# Patient Record
Sex: MALE | Race: WHITE | Employment: FULL TIME | URBAN - NONMETROPOLITAN AREA
[De-identification: names, ages, dates, MRNs, and addresses within clinical notes are randomized per-mention and may not be internally consistent; named-entity substitution may affect disease eponyms.]

---

## 2019-11-19 ENCOUNTER — OFFICE VISIT (OUTPATIENT)
Dept: FAMILY MEDICINE CLINIC | Age: 54
End: 2019-11-19
Payer: COMMERCIAL

## 2019-11-19 VITALS
HEIGHT: 70 IN | BODY MASS INDEX: 33.76 KG/M2 | TEMPERATURE: 98.5 F | DIASTOLIC BLOOD PRESSURE: 82 MMHG | HEART RATE: 80 BPM | SYSTOLIC BLOOD PRESSURE: 134 MMHG | WEIGHT: 235.8 LBS | RESPIRATION RATE: 20 BRPM

## 2019-11-19 DIAGNOSIS — K64.4 EXTERNAL HEMORRHOID: ICD-10-CM

## 2019-11-19 DIAGNOSIS — Z12.5 SCREENING FOR PROSTATE CANCER: ICD-10-CM

## 2019-11-19 DIAGNOSIS — I10 ESSENTIAL HYPERTENSION: ICD-10-CM

## 2019-11-19 DIAGNOSIS — Z00.00 ROUTINE PHYSICAL EXAMINATION: Primary | ICD-10-CM

## 2019-11-19 PROCEDURE — 99386 PREV VISIT NEW AGE 40-64: CPT | Performed by: NURSE PRACTITIONER

## 2019-11-19 RX ORDER — LISINOPRIL 20 MG/1
20 TABLET ORAL DAILY
COMMUNITY
End: 2019-11-19 | Stop reason: SDUPTHER

## 2019-11-19 RX ORDER — LISINOPRIL 20 MG/1
20 TABLET ORAL DAILY
Qty: 90 TABLET | Refills: 3 | Status: SHIPPED | OUTPATIENT
Start: 2019-11-19 | End: 2020-11-17 | Stop reason: SDUPTHER

## 2019-11-19 RX ORDER — METAPROTERENOL SULFATE 10 MG
1 TABLET ORAL DAILY
COMMUNITY

## 2019-11-19 SDOH — HEALTH STABILITY: MENTAL HEALTH: HOW MANY STANDARD DRINKS CONTAINING ALCOHOL DO YOU HAVE ON A TYPICAL DAY?: 5 OR 6

## 2019-11-19 SDOH — HEALTH STABILITY: MENTAL HEALTH: HOW OFTEN DO YOU HAVE A DRINK CONTAINING ALCOHOL?: 2-4 TIMES A MONTH

## 2019-11-19 ASSESSMENT — ENCOUNTER SYMPTOMS
GASTROINTESTINAL NEGATIVE: 1
RESPIRATORY NEGATIVE: 1
EYES NEGATIVE: 1

## 2019-11-19 ASSESSMENT — PATIENT HEALTH QUESTIONNAIRE - PHQ9
SUM OF ALL RESPONSES TO PHQ9 QUESTIONS 1 & 2: 0
2. FEELING DOWN, DEPRESSED OR HOPELESS: 0
SUM OF ALL RESPONSES TO PHQ QUESTIONS 1-9: 0
1. LITTLE INTEREST OR PLEASURE IN DOING THINGS: 0
SUM OF ALL RESPONSES TO PHQ QUESTIONS 1-9: 0

## 2020-02-17 ENCOUNTER — TELEPHONE (OUTPATIENT)
Dept: FAMILY MEDICINE CLINIC | Age: 55
End: 2020-02-17

## 2020-02-17 RX ORDER — AMOXICILLIN 500 MG/1
500 CAPSULE ORAL 3 TIMES DAILY
Qty: 30 CAPSULE | Refills: 0 | Status: SHIPPED | OUTPATIENT
Start: 2020-02-17 | End: 2020-02-27

## 2020-11-17 ENCOUNTER — OFFICE VISIT (OUTPATIENT)
Dept: FAMILY MEDICINE CLINIC | Age: 55
End: 2020-11-17
Payer: COMMERCIAL

## 2020-11-17 VITALS
SYSTOLIC BLOOD PRESSURE: 128 MMHG | WEIGHT: 232 LBS | TEMPERATURE: 97.6 F | RESPIRATION RATE: 16 BRPM | BODY MASS INDEX: 33.05 KG/M2 | DIASTOLIC BLOOD PRESSURE: 78 MMHG | HEART RATE: 75 BPM

## 2020-11-17 PROCEDURE — 99396 PREV VISIT EST AGE 40-64: CPT | Performed by: NURSE PRACTITIONER

## 2020-11-17 RX ORDER — MELOXICAM 15 MG/1
15 TABLET ORAL DAILY
Qty: 10 TABLET | Refills: 0 | Status: SHIPPED | OUTPATIENT
Start: 2020-11-17 | End: 2021-12-15

## 2020-11-17 RX ORDER — LISINOPRIL 20 MG/1
20 TABLET ORAL DAILY
Qty: 90 TABLET | Refills: 3 | Status: SHIPPED | OUTPATIENT
Start: 2020-11-17 | End: 2021-11-17 | Stop reason: SDUPTHER

## 2020-11-17 SDOH — ECONOMIC STABILITY: INCOME INSECURITY: HOW HARD IS IT FOR YOU TO PAY FOR THE VERY BASICS LIKE FOOD, HOUSING, MEDICAL CARE, AND HEATING?: NOT HARD AT ALL

## 2020-11-17 SDOH — ECONOMIC STABILITY: FOOD INSECURITY: WITHIN THE PAST 12 MONTHS, YOU WORRIED THAT YOUR FOOD WOULD RUN OUT BEFORE YOU GOT MONEY TO BUY MORE.: NEVER TRUE

## 2020-11-17 SDOH — ECONOMIC STABILITY: FOOD INSECURITY: WITHIN THE PAST 12 MONTHS, THE FOOD YOU BOUGHT JUST DIDN'T LAST AND YOU DIDN'T HAVE MONEY TO GET MORE.: NEVER TRUE

## 2020-11-17 SDOH — ECONOMIC STABILITY: TRANSPORTATION INSECURITY
IN THE PAST 12 MONTHS, HAS LACK OF TRANSPORTATION KEPT YOU FROM MEETINGS, WORK, OR FROM GETTING THINGS NEEDED FOR DAILY LIVING?: NO

## 2020-11-17 SDOH — ECONOMIC STABILITY: TRANSPORTATION INSECURITY
IN THE PAST 12 MONTHS, HAS THE LACK OF TRANSPORTATION KEPT YOU FROM MEDICAL APPOINTMENTS OR FROM GETTING MEDICATIONS?: NO

## 2020-11-17 NOTE — PROGRESS NOTES
Chief Complaint:   Vivian Holder is a 48 y.o. male who presents for complete physical examination    History of Present Illness:    Chief Complaint   Patient presents with    Annual Exam    Headache     off and on. Patient states has been having a stiff neck, is going to try massage first.     Other     discuss home blood pressure ready 128/70s last night. this morning before taking medication 135/82     Health Maintenance   Topic Date Due    Potassium monitoring  1966    Creatinine monitoring  1966    HIV screen  11/10/1981    Lipid screen  11/10/2006    Diabetes screen  11/10/2006    Shingles Vaccine (1 of 2) 11/10/2016    Flu vaccine (1) 09/01/2020    DTaP/Tdap/Td vaccine (2 - Td) 11/19/2025    Colon cancer screen colonoscopy  12/15/2026    Hepatitis A vaccine  Aged Out    Hepatitis B vaccine  Aged Out    Hib vaccine  Aged Out    Meningococcal (ACWY) vaccine  Aged Out    Pneumococcal 0-64 years Vaccine  Aged Out     Pt here for annual exam.  Did report a few weeks ago he slept awkwardly and then had pain in neck. No pain in arms. Uses tylenol with relief. Is scheduled to get a massage. No radicular pain. There is no problem list on file for this patient. History reviewed. No pertinent past medical history. History reviewed. No pertinent surgical history. Current Outpatient Medications   Medication Sig Dispense Refill    meloxicam (MOBIC) 15 MG tablet Take 1 tablet by mouth daily 10 tablet 0    lisinopril (PRINIVIL;ZESTRIL) 20 MG tablet Take 1 tablet by mouth daily 90 tablet 3    hydrocortisone 2.5 % cream Apply topically 2 times daily. 28 g 2    Omega-3 Fatty Acids (OMEGA-3 FISH OIL) 500 MG CAPS Take 1 capsule by mouth daily      hydrocortisone (PROCTOZONE-HC) 2.5 % rectal cream Place rectally 2 times daily Indications: Patient is using prn Place rectally 2 times daily. 1 Tube 11     No current facility-administered medications for this visit.       No Known Allergies    Social History     Socioeconomic History    Marital status:      Spouse name: None    Number of children: None    Years of education: None    Highest education level: None   Occupational History    None   Social Needs    Financial resource strain: Not hard at all   [place] insecurity     Worry: Never true     Inability: Never true   [employer] needs     Medical: No     Non-medical: No   Tobacco Use    Smoking status: Never Smoker    Smokeless tobacco: Never Used   Substance and Sexual Activity    Alcohol use:  Yes     Alcohol/week: 0.0 standard drinks     Frequency: 2-4 times a month     Drinks per session: 5 or 6     Binge frequency: Less than monthly    Drug use: Never    Sexual activity: None   Lifestyle    Physical activity     Days per week: None     Minutes per session: None    Stress: None   Relationships    Social connections     Talks on phone: None     Gets together: None     Attends Nondenominational service: None     Active member of club or organization: None     Attends meetings of clubs or organizations: None     Relationship status: None    Intimate partner violence     Fear of current or ex partner: None     Emotionally abused: None     Physically abused: None     Forced sexual activity: None   Other Topics Concern    None   Social History Narrative    None     Family History   Problem Relation Age of Onset    Cancer Father     Thyroid Disease Sister     Thyroid Disease Brother     Colon Cancer Paternal Grandmother                             Review Of Systems  Skin: no abnormal pigmentation, rash, scaling, itching, masses, hair or nail changes  Eyes: no blurring, diplopia, or eye pain  Ears/Nose/Throat: no hearing loss, tinnitus, vertigo, nosebleed, nasal congestion, rhinorrhea, sore throat  Respiratory: no cough, pleuritic chest pain, dyspnea, or wheezing  Cardiovascular: no angina, AMANDA, orthopnea, PND, palpitations, or claudication  Gastrointestinal: no nausea, vomiting, heartburn, diarrhea, constipation, bloating, or abdominal pain  Genitourinary: no urinary urgency, frequency, dysuria, nocturia, hesitancy, or incontinence  Musculoskeletal: neck pain  Neurologic: no paralysis, paresis, paresthesia, seizures, tremors, or headaches  Hematologic/Lymphatic/Immunologic: no anemia, abnormal bleeding/bruising, fever, chills, night sweats, swollen glands, or unexplained weight loss  Endocrine: no heat or cold intolerance and no polyphagia, polydipsia, or polyuria    PHYSICAL EXAMINATION:  /78 (Site: Left Upper Arm, Position: Sitting, Cuff Size: Large Adult)   Pulse 75   Temp 97.6 °F (36.4 °C) (Temporal)   Resp 16   Wt 232 lb (105.2 kg)   BMI 33.05 kg/m²   General appearance: healthy, alert, no distress  Skin: Skin color, texture, turgor normal. No rashes or lesions. No induration or tightening palpated. Head: Normocephalic. No masses, lesions, tenderness or abnormalities  Eyes: conjunctivae/corneas clear. PERRL, EOM's intact. Fundi are normal, no papilledema, hemorrhages or exudates. No AV crossing changes are noted. Ears: External ears normal. Canals clear. TM's clear bilaterally. Hearing normal to finger rub. Nose/Sinuses: Nares normal. Septum midline. Mucosa normal. No drainage or sinus tenderness. Oropharynx: Lips, mucosa, and tongue normal. Teeth and gums normal. Oropharynx clear with no exudate seen. Neck: Neck supple, and symmetric. No adenopathy. Thyroid symmetric, normal size, without nodule. Trachea is midline. Back: Back symmetric, no curvature. ROM normal. No CVA tenderness. Lungs: Good diaphragmatic excursion. Lungs clear to auscultation bilaterally. No retractions or use of accessory muscles. No tactile fremitus. Normal chest percussion. Heart: PMI is not displaced, and no thrill noted. Regular rate and rhythm, with no rub, murmur or gallop noted. Abdomen: Abdomen soft, non-tender. BS normal. No masses, organomegaly.   No hernia noted.  Extremities: Extremities normal. No deformities, edema, or skin discoloration. No cyanosis or clubbing noted to the nails. Lymph: No lymphadenopathy of the neck or supraclavicular regions. Musculoskeletal: Spine ROM normal. Muscular strength intact. Peripheral pulses: radial=4/4, femoral=4/4, dorsalis pedis=4/4,  Neuro: Cranial nerves intact, Gait normal. Reflexes normal and symmetric, with no pathologic reflex noted. No focal weakness. Normal sensation to light touch. No components found for: CHLPL  No results found for: TRIG  No results found for: HDL  No results found for: LDLCALC  No results found for: LABVLDL  No results found for: PSA      ASSESSMENT:     Diagnosis Orders   1. Routine physical examination  CBC Auto Differential    Comprehensive Metabolic Panel    Lipid Panel   2. Essential hypertension  lisinopril (PRINIVIL;ZESTRIL) 20 MG tablet   3. Screening for prostate cancer  PSA Screening   4. Neck pain  meloxicam (MOBIC) 15 MG tablet         Plan:   See orders and medications filed with this encounter. Advised on preventative health maintenance guidelines and schedules to be completed. reviewed appropriate vaccines. Pt refused will get vaccines at later date. Did give brief course of mobic for his neck  Orders per enc. To call with any questions or concerns.

## 2021-01-21 ENCOUNTER — TELEPHONE (OUTPATIENT)
Dept: FAMILY MEDICINE CLINIC | Age: 56
End: 2021-01-21

## 2021-01-21 ENCOUNTER — HOSPITAL ENCOUNTER (OUTPATIENT)
Age: 56
Discharge: HOME | End: 2021-01-21
Payer: COMMERCIAL

## 2021-01-21 DIAGNOSIS — Z00.00 ROUTINE PHYSICAL EXAMINATION: ICD-10-CM

## 2021-01-21 DIAGNOSIS — E78.5 HYPERLIPIDEMIA, UNSPECIFIED HYPERLIPIDEMIA TYPE: Primary | ICD-10-CM

## 2021-01-21 DIAGNOSIS — Z12.5 SCREENING FOR PROSTATE CANCER: ICD-10-CM

## 2021-01-21 LAB
ALBUMIN SERPL-MCNC: 4.6 G/DL (ref 3.5–5.1)
ALP BLD-CCNC: 47 U/L (ref 38–126)
ALT SERPL-CCNC: 17 U/L (ref 11–66)
ANION GAP SERPL CALCULATED.3IONS-SCNC: 8 MEQ/L (ref 8–16)
AST SERPL-CCNC: 18 U/L (ref 5–40)
BASOPHILS # BLD: 0.7 %
BASOPHILS ABSOLUTE: 0 THOU/MM3 (ref 0–0.1)
BILIRUB SERPL-MCNC: 0.5 MG/DL (ref 0.3–1.2)
BUN BLDV-MCNC: 15 MG/DL (ref 7–22)
CALCIUM SERPL-MCNC: 9.8 MG/DL (ref 8.5–10.5)
CHLORIDE BLD-SCNC: 101 MEQ/L (ref 98–111)
CHOLESTEROL, TOTAL: 224 MG/DL (ref 100–199)
CO2: 27 MEQ/L (ref 23–33)
CREAT SERPL-MCNC: 0.9 MG/DL (ref 0.4–1.2)
EOSINOPHIL # BLD: 1.8 %
EOSINOPHILS ABSOLUTE: 0.1 THOU/MM3 (ref 0–0.4)
ERYTHROCYTE [DISTWIDTH] IN BLOOD BY AUTOMATED COUNT: 13.3 % (ref 11.5–14.5)
ERYTHROCYTE [DISTWIDTH] IN BLOOD BY AUTOMATED COUNT: 43.8 FL (ref 35–45)
GFR SERPL CREATININE-BSD FRML MDRD: 88 ML/MIN/1.73M2
GLUCOSE BLD-MCNC: 101 MG/DL (ref 70–108)
HCT VFR BLD CALC: 47.7 % (ref 42–52)
HDLC SERPL-MCNC: 40 MG/DL
HEMOGLOBIN: 15.4 GM/DL (ref 14–18)
IMMATURE GRANS (ABS): 0.01 THOU/MM3 (ref 0–0.07)
IMMATURE GRANULOCYTES: 0.2 %
LDL CHOLESTEROL CALCULATED: 163 MG/DL
LYMPHOCYTES # BLD: 44.6 %
LYMPHOCYTES ABSOLUTE: 2.5 THOU/MM3 (ref 1–4.8)
MCH RBC QN AUTO: 28.9 PG (ref 26–33)
MCHC RBC AUTO-ENTMCNC: 32.3 GM/DL (ref 32.2–35.5)
MCV RBC AUTO: 89.7 FL (ref 80–94)
MONOCYTES # BLD: 8.8 %
MONOCYTES ABSOLUTE: 0.5 THOU/MM3 (ref 0.4–1.3)
NUCLEATED RED BLOOD CELLS: 0 /100 WBC
PLATELET # BLD: 287 THOU/MM3 (ref 130–400)
PMV BLD AUTO: 10.8 FL (ref 9.4–12.4)
POTASSIUM SERPL-SCNC: 4.5 MEQ/L (ref 3.5–5.2)
PROSTATE SPECIFIC ANTIGEN: 0.88 NG/ML (ref 0–1)
RBC # BLD: 5.32 MILL/MM3 (ref 4.7–6.1)
SEG NEUTROPHILS: 43.9 %
SEGMENTED NEUTROPHILS ABSOLUTE COUNT: 2.5 THOU/MM3 (ref 1.8–7.7)
SODIUM BLD-SCNC: 136 MEQ/L (ref 135–145)
TOTAL PROTEIN: 7.5 G/DL (ref 6.1–8)
TRIGL SERPL-MCNC: 104 MG/DL (ref 0–199)
WBC # BLD: 5.6 THOU/MM3 (ref 4.8–10.8)

## 2021-01-21 PROCEDURE — 85025 COMPLETE CBC W/AUTO DIFF WBC: CPT

## 2021-01-21 PROCEDURE — 80053 COMPREHEN METABOLIC PANEL: CPT

## 2021-01-21 PROCEDURE — 36415 COLL VENOUS BLD VENIPUNCTURE: CPT

## 2021-01-21 PROCEDURE — 80061 LIPID PANEL: CPT

## 2021-01-21 PROCEDURE — G0103 PSA SCREENING: HCPCS

## 2021-01-21 RX ORDER — ATORVASTATIN CALCIUM 20 MG/1
20 TABLET, FILM COATED ORAL DAILY
Qty: 30 TABLET | Refills: 11 | Status: SHIPPED | OUTPATIENT
Start: 2021-01-21 | End: 2021-04-29

## 2021-01-21 NOTE — TELEPHONE ENCOUNTER
Please call. Labs were good except his cholesterol was quite high. Start lipitor daily.   Recheck labs in 2months

## 2021-04-28 ENCOUNTER — NURSE ONLY (OUTPATIENT)
Dept: LAB | Age: 56
End: 2021-04-28

## 2021-04-28 DIAGNOSIS — E78.5 HYPERLIPIDEMIA, UNSPECIFIED HYPERLIPIDEMIA TYPE: ICD-10-CM

## 2021-04-28 LAB
ALT SERPL-CCNC: 19 U/L (ref 11–66)
AST SERPL-CCNC: 20 U/L (ref 5–40)
CHOLESTEROL, TOTAL: 156 MG/DL (ref 100–199)
HDLC SERPL-MCNC: 43 MG/DL
LDL CHOLESTEROL CALCULATED: 93 MG/DL
TRIGL SERPL-MCNC: 102 MG/DL (ref 0–199)

## 2021-04-29 ENCOUNTER — TELEPHONE (OUTPATIENT)
Dept: FAMILY MEDICINE CLINIC | Age: 56
End: 2021-04-29

## 2021-04-29 DIAGNOSIS — E78.5 HYPERLIPIDEMIA, UNSPECIFIED HYPERLIPIDEMIA TYPE: ICD-10-CM

## 2021-04-29 RX ORDER — ATORVASTATIN CALCIUM 10 MG/1
10 TABLET, FILM COATED ORAL DAILY
Qty: 30 TABLET | Refills: 11 | Status: SHIPPED | OUTPATIENT
Start: 2021-04-29 | End: 2022-04-15 | Stop reason: SDUPTHER

## 2021-04-29 NOTE — TELEPHONE ENCOUNTER
----- Message from Camille Sanches (AAMA) sent at 4/29/2021 10:29 AM EDT -----  Pt states he is having joint pain. He is asking if maybe his dose can either be lowered or change medication.  Please advise

## 2021-07-27 ENCOUNTER — OFFICE VISIT (OUTPATIENT)
Dept: FAMILY MEDICINE CLINIC | Age: 56
End: 2021-07-27
Payer: COMMERCIAL

## 2021-07-27 VITALS
RESPIRATION RATE: 18 BRPM | HEIGHT: 70 IN | BODY MASS INDEX: 34.04 KG/M2 | OXYGEN SATURATION: 97 % | TEMPERATURE: 97 F | HEART RATE: 81 BPM | SYSTOLIC BLOOD PRESSURE: 164 MMHG | WEIGHT: 237.8 LBS | DIASTOLIC BLOOD PRESSURE: 88 MMHG

## 2021-07-27 DIAGNOSIS — T23.162A SUPERFICIAL BURN OF BACK OF LEFT HAND, INITIAL ENCOUNTER: Primary | ICD-10-CM

## 2021-07-27 PROCEDURE — 99213 OFFICE O/P EST LOW 20 MIN: CPT | Performed by: NURSE PRACTITIONER

## 2021-07-27 ASSESSMENT — ENCOUNTER SYMPTOMS
GASTROINTESTINAL NEGATIVE: 1
RESPIRATORY NEGATIVE: 1
EYES NEGATIVE: 1
COLOR CHANGE: 1

## 2021-07-27 ASSESSMENT — PATIENT HEALTH QUESTIONNAIRE - PHQ9
SUM OF ALL RESPONSES TO PHQ QUESTIONS 1-9: 0
SUM OF ALL RESPONSES TO PHQ QUESTIONS 1-9: 0
SUM OF ALL RESPONSES TO PHQ9 QUESTIONS 1 & 2: 0
1. LITTLE INTEREST OR PLEASURE IN DOING THINGS: 0
SUM OF ALL RESPONSES TO PHQ QUESTIONS 1-9: 0
2. FEELING DOWN, DEPRESSED OR HOPELESS: 0
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
## 2021-11-17 ENCOUNTER — OFFICE VISIT (OUTPATIENT)
Dept: FAMILY MEDICINE CLINIC | Age: 56
End: 2021-11-17
Payer: COMMERCIAL

## 2021-11-17 VITALS
HEIGHT: 70 IN | RESPIRATION RATE: 17 BRPM | OXYGEN SATURATION: 95 % | WEIGHT: 239.8 LBS | DIASTOLIC BLOOD PRESSURE: 100 MMHG | BODY MASS INDEX: 34.33 KG/M2 | SYSTOLIC BLOOD PRESSURE: 144 MMHG | TEMPERATURE: 97.3 F | HEART RATE: 83 BPM

## 2021-11-17 DIAGNOSIS — Z12.5 SCREENING FOR PROSTATE CANCER: ICD-10-CM

## 2021-11-17 DIAGNOSIS — Z80.0 FAMILY HISTORY OF COLON CANCER: ICD-10-CM

## 2021-11-17 DIAGNOSIS — Z00.00 ROUTINE PHYSICAL EXAMINATION: Primary | ICD-10-CM

## 2021-11-17 DIAGNOSIS — I10 ESSENTIAL HYPERTENSION: ICD-10-CM

## 2021-11-17 PROCEDURE — 99396 PREV VISIT EST AGE 40-64: CPT | Performed by: NURSE PRACTITIONER

## 2021-11-17 RX ORDER — LISINOPRIL 20 MG/1
20 TABLET ORAL DAILY
Qty: 90 TABLET | Refills: 3 | Status: SHIPPED | OUTPATIENT
Start: 2021-11-17 | End: 2021-11-19 | Stop reason: SDUPTHER

## 2021-11-17 ASSESSMENT — ENCOUNTER SYMPTOMS
RESPIRATORY NEGATIVE: 1
GASTROINTESTINAL NEGATIVE: 1
EYES NEGATIVE: 1

## 2021-11-17 NOTE — PROGRESS NOTES
Osei Chacon is a 55 y.o. male whopresents today for :  Chief Complaint   Patient presents with    Annual Exam       HPI:     HPI  Pt here for routine check up. He has some occasional right knee pain. bp is up in the office but has bp cuff at home and has been very good    There is no problem list on file for this patient. History reviewed. No pertinent past medical history. History reviewed. No pertinent surgical history. Family History   Problem Relation Age of Onset    Cancer Father     Thyroid Disease Sister     Thyroid Disease Brother     Colon Cancer Paternal Grandmother      Social History     Tobacco Use    Smoking status: Never Smoker    Smokeless tobacco: Never Used   Substance Use Topics    Alcohol use: Yes     Alcohol/week: 0.0 standard drinks      Current Outpatient Medications   Medication Sig Dispense Refill    lisinopril (PRINIVIL;ZESTRIL) 20 MG tablet Take 1 tablet by mouth daily 90 tablet 3    hydrocortisone 2.5 % cream Apply topically 2 times daily. 28 g 2    atorvastatin (LIPITOR) 10 MG tablet Take 1 tablet by mouth daily 30 tablet 11    Omega-3 Fatty Acids (OMEGA-3 FISH OIL) 500 MG CAPS Take 1 capsule by mouth daily      hydrocortisone (PROCTOZONE-HC) 2.5 % rectal cream Place rectally 2 times daily Indications: Patient is using prn Place rectally 2 times daily. 1 Tube 11    silver sulfADIAZINE (SILVADENE) 1 % cream Apply topically daily. (Patient not taking: Reported on 11/17/2021) 25 g 1    meloxicam (MOBIC) 15 MG tablet Take 1 tablet by mouth daily (Patient not taking: Reported on 7/27/2021) 10 tablet 0     No current facility-administered medications for this visit.      No Known Allergies  Health Maintenance   Topic Date Due    COVID-19 Vaccine (1) Never done    Shingles Vaccine (1 of 2) Never done    Flu vaccine (1) Never done    Diabetes screen  11/17/2022 (Originally 11/10/2006)    Potassium monitoring  01/21/2022    Creatinine monitoring  01/21/2022    Lipid screen  04/28/2022    DTaP/Tdap/Td vaccine (2 - Td or Tdap) 11/19/2025    Colon cancer screen colonoscopy  12/15/2026    Hepatitis C screen  Completed    Hepatitis A vaccine  Aged Out    Hepatitis B vaccine  Aged Out    Hib vaccine  Aged Out    Meningococcal (ACWY) vaccine  Aged Out    Pneumococcal 0-64 years Vaccine  Aged Out    HIV screen  Discontinued       Subjective:     Review of Systems   Constitutional: Negative. HENT: Negative. Eyes: Negative. Respiratory: Negative. Cardiovascular: Negative. Gastrointestinal: Negative. Musculoskeletal: Positive for arthralgias. Skin: Negative. Neurological: Negative. Objective:     Vitals:    11/17/21 1323 11/17/21 1357   BP: (!) 165/90 (!) 144/100   Site: Left Upper Arm    Position: Sitting    Cuff Size: Medium Adult    Pulse: 83    Resp: 17    Temp: 97.3 °F (36.3 °C)    TempSrc: Temporal    SpO2: 95%    Weight: 239 lb 12.8 oz (108.8 kg)    Height: 5' 10\" (1.778 m)        Physical Exam  Constitutional:       Appearance: He is well-developed. HENT:      Head: Normocephalic. Right Ear: Tympanic membrane and external ear normal.      Left Ear: Tympanic membrane and external ear normal.      Nose: Nose normal.   Cardiovascular:      Rate and Rhythm: Normal rate and regular rhythm. Heart sounds: Normal heart sounds. No murmur heard. No friction rub. No gallop. Pulmonary:      Effort: Pulmonary effort is normal.      Breath sounds: Normal breath sounds. No wheezing or rales. Abdominal:      General: Bowel sounds are normal.      Palpations: Abdomen is soft. Tenderness: There is no abdominal tenderness. There is no guarding. Musculoskeletal:         General: Normal range of motion. Cervical back: Normal range of motion and neck supple. Lymphadenopathy:      Cervical: No cervical adenopathy. Skin:     General: Skin is warm.    Neurological:      Mental Status: He is alert and oriented to person, place, and time. Deep Tendon Reflexes: Reflexes are normal and symmetric. Assessment:      Diagnosis Orders   1. Routine physical examination  CBC Auto Differential    Lipid Panel    Comprehensive Metabolic Panel   2. Essential hypertension  lisinopril (PRINIVIL;ZESTRIL) 20 MG tablet   3. Family history of colon cancer     4. Screening for prostate cancer  PSA Screening       Plan:      No follow-ups on file. Orders Placed This Encounter   Procedures    CBC Auto Differential     Standing Status:   Future     Standing Expiration Date:   11/17/2022    Lipid Panel     Standing Status:   Future     Standing Expiration Date:   11/17/2022     Order Specific Question:   Is Patient Fasting?/# of Hours     Answer:   12    Comprehensive Metabolic Panel     Standing Status:   Future     Standing Expiration Date:   11/17/2022    PSA Screening     Standing Status:   Future     Standing Expiration Date:   11/17/2022     Orders Placed This Encounter   Medications    lisinopril (PRINIVIL;ZESTRIL) 20 MG tablet     Sig: Take 1 tablet by mouth daily     Dispense:  90 tablet     Refill:  3    hydrocortisone 2.5 % cream     Sig: Apply topically 2 times daily. Dispense:  28 g     Refill:  2      meds refilled  Labs ordered  Pt to bring in bp cuff and compare to office results  Reviewed vaccinations     Patient given educational materials - seepatient instructions. Discussed use, benefit, and side effects of prescribed medications. All patient questions answered. Pt voiced understanding. Patient agreed withtreatment plan. Follow up as directed.      Electronically signed by DAMIEN Rendon CNP on 11/17/2021 at 5:43 PM

## 2021-11-19 ENCOUNTER — NURSE ONLY (OUTPATIENT)
Dept: FAMILY MEDICINE CLINIC | Age: 56
End: 2021-11-19

## 2021-11-19 VITALS — OXYGEN SATURATION: 98 % | SYSTOLIC BLOOD PRESSURE: 142 MMHG | HEART RATE: 77 BPM | DIASTOLIC BLOOD PRESSURE: 82 MMHG

## 2021-11-19 DIAGNOSIS — I10 ESSENTIAL HYPERTENSION: ICD-10-CM

## 2021-11-19 RX ORDER — LISINOPRIL 30 MG/1
30 TABLET ORAL DAILY
Qty: 90 TABLET | Refills: 3 | Status: SHIPPED | OUTPATIENT
Start: 2021-11-19 | End: 2022-05-19

## 2021-11-19 NOTE — PROGRESS NOTES
Since the BP on his cuff was higher and inconsistent, he needs to consider a new BP cuff. Also, if the BP is high in different situations often, we need to increase the dose of lisinopril to 30 mg.       Let me know how he wants to proceed    Call patient

## 2021-11-19 NOTE — PROGRESS NOTES
Pt reports in office for a BP check. Pt brought home BP cuff in for a check to make sure it's reading accurately. Pt stated BP in office tends to run high and him seems to run normal range. Pt took BP med this morning. Pt reports being a symptomatic    Pt's BP in office today with our BP cuff was 142/82  P 77. Pt's BP with at home cuff in office was 158/101 P 70. Checked a 2nd time on other arm and reading was 156/89. Pt took a 3rd time and reading was 148/83.       Pt said BP at home this morning with his cuff was 124/80

## 2021-11-19 NOTE — PROGRESS NOTES
Patient aware and voiced understanding. Pt stated he is going to look into his BP cuff and also he would like to try the increase in Lisinopril to 30mg. Pt uses [facility] pharmacy.

## 2021-11-22 NOTE — PROGRESS NOTES
Patient aware and voiced understanding, no concerns voiced at this time. Pt scheduled for BP nurse visit in 3 weeks on 12/15/2021.

## 2021-12-15 ENCOUNTER — NURSE ONLY (OUTPATIENT)
Dept: FAMILY MEDICINE CLINIC | Age: 56
End: 2021-12-15

## 2021-12-15 VITALS — DIASTOLIC BLOOD PRESSURE: 78 MMHG | HEART RATE: 64 BPM | OXYGEN SATURATION: 99 % | SYSTOLIC BLOOD PRESSURE: 138 MMHG

## 2021-12-15 PROCEDURE — 99999 PR OFFICE/OUTPT VISIT,PROCEDURE ONLY: CPT | Performed by: NURSE PRACTITIONER

## 2021-12-15 NOTE — PROGRESS NOTES
Pt reports in office this morning for a BP check. Pt stated at home BP this morning before meds were 134/79 P 75. Pt reports being asymptomatic. Doing well with all medications and taking them as prescribed. Pt did state he feels nervous being here and usually does when he comes. Pt's BP in office today was 152/88  P 81. Pt remained in office for 15 minutes.   Re checked /78   P 64

## 2021-12-15 NOTE — PROGRESS NOTES
Continue meds as prescribed. Home bp are doing ok.   Concentrate on low salt diet and weight loss as well

## 2022-01-26 ENCOUNTER — NURSE ONLY (OUTPATIENT)
Dept: LAB | Age: 57
End: 2022-01-26

## 2022-01-26 DIAGNOSIS — I10 ESSENTIAL HYPERTENSION: Primary | ICD-10-CM

## 2022-01-26 DIAGNOSIS — Z00.00 ROUTINE PHYSICAL EXAMINATION: ICD-10-CM

## 2022-01-26 DIAGNOSIS — Z12.5 SCREENING FOR PROSTATE CANCER: ICD-10-CM

## 2022-01-26 LAB
ALBUMIN SERPL-MCNC: 4.7 G/DL (ref 3.5–5.1)
ALP BLD-CCNC: 52 U/L (ref 38–126)
ALT SERPL-CCNC: 16 U/L (ref 11–66)
ANION GAP SERPL CALCULATED.3IONS-SCNC: 11 MEQ/L (ref 8–16)
AST SERPL-CCNC: 18 U/L (ref 5–40)
BASOPHILS # BLD: 1.1 %
BASOPHILS ABSOLUTE: 0.1 THOU/MM3 (ref 0–0.1)
BILIRUB SERPL-MCNC: 0.5 MG/DL (ref 0.3–1.2)
BUN BLDV-MCNC: 14 MG/DL (ref 7–22)
CALCIUM SERPL-MCNC: 9.7 MG/DL (ref 8.5–10.5)
CHLORIDE BLD-SCNC: 105 MEQ/L (ref 98–111)
CHOLESTEROL, TOTAL: 142 MG/DL (ref 100–199)
CO2: 25 MEQ/L (ref 23–33)
CREAT SERPL-MCNC: 0.8 MG/DL (ref 0.4–1.2)
EOSINOPHIL # BLD: 2.9 %
EOSINOPHILS ABSOLUTE: 0.2 THOU/MM3 (ref 0–0.4)
ERYTHROCYTE [DISTWIDTH] IN BLOOD BY AUTOMATED COUNT: 13.2 % (ref 11.5–14.5)
ERYTHROCYTE [DISTWIDTH] IN BLOOD BY AUTOMATED COUNT: 43.5 FL (ref 35–45)
GFR SERPL CREATININE-BSD FRML MDRD: > 90 ML/MIN/1.73M2
GLUCOSE BLD-MCNC: 101 MG/DL (ref 70–108)
HCT VFR BLD CALC: 48 % (ref 42–52)
HDLC SERPL-MCNC: 42 MG/DL
HEMOGLOBIN: 15.3 GM/DL (ref 14–18)
IMMATURE GRANS (ABS): 0.01 THOU/MM3 (ref 0–0.07)
IMMATURE GRANULOCYTES: 0.2 %
LDL CHOLESTEROL CALCULATED: 87 MG/DL
LYMPHOCYTES # BLD: 40.9 %
LYMPHOCYTES ABSOLUTE: 2.3 THOU/MM3 (ref 1–4.8)
MCH RBC QN AUTO: 28.9 PG (ref 26–33)
MCHC RBC AUTO-ENTMCNC: 31.9 GM/DL (ref 32.2–35.5)
MCV RBC AUTO: 90.7 FL (ref 80–94)
MONOCYTES # BLD: 8.8 %
MONOCYTES ABSOLUTE: 0.5 THOU/MM3 (ref 0.4–1.3)
NUCLEATED RED BLOOD CELLS: 0 /100 WBC
PLATELET # BLD: 263 THOU/MM3 (ref 130–400)
PMV BLD AUTO: 11 FL (ref 9.4–12.4)
POTASSIUM SERPL-SCNC: 5.5 MEQ/L (ref 3.5–5.2)
PROSTATE SPECIFIC ANTIGEN: 0.89 NG/ML (ref 0–1)
RBC # BLD: 5.29 MILL/MM3 (ref 4.7–6.1)
SEG NEUTROPHILS: 46.1 %
SEGMENTED NEUTROPHILS ABSOLUTE COUNT: 2.6 THOU/MM3 (ref 1.8–7.7)
SODIUM BLD-SCNC: 141 MEQ/L (ref 135–145)
TOTAL PROTEIN: 7.3 G/DL (ref 6.1–8)
TRIGL SERPL-MCNC: 66 MG/DL (ref 0–199)
WBC # BLD: 5.6 THOU/MM3 (ref 4.8–10.8)

## 2022-03-29 ENCOUNTER — TELEPHONE (OUTPATIENT)
Dept: FAMILY MEDICINE CLINIC | Age: 57
End: 2022-03-29

## 2022-03-29 DIAGNOSIS — H90.3 SENSORINEURAL HEARING LOSS (SNHL) OF BOTH EARS: Primary | ICD-10-CM

## 2022-03-29 NOTE — TELEPHONE ENCOUNTER
----- Message from Shantell Rebeca sent at 3/29/2022  4:53 PM EDT -----  Subject: Referral Request    QUESTIONS   Reason for referral request? Referral to ENT in [place], [place]   Has the physician seen you for this condition before? No   Preferred Specialist (if applicable)? Milad Robbins  Do you already have an appointment scheduled? No  Additional Information for Provider?   ---------------------------------------------------------------------------  --------------  CALL BACK INFO  What is the best way for the office to contact you? OK to leave message on   voicemail  Preferred Call Back Phone Number?  [phone]

## 2022-03-29 NOTE — TELEPHONE ENCOUNTER
Pt wants to go to ENT with [facility] for deminished hearing in both ears. He wants to go to this ENT because his daughter works there.

## 2022-03-31 ENCOUNTER — TELEPHONE (OUTPATIENT)
Dept: FAMILY MEDICINE CLINIC | Age: 57
End: 2022-03-31

## 2022-03-31 ENCOUNTER — NURSE ONLY (OUTPATIENT)
Dept: LAB | Age: 57
End: 2022-03-31

## 2022-03-31 DIAGNOSIS — I10 ESSENTIAL HYPERTENSION: ICD-10-CM

## 2022-03-31 DIAGNOSIS — H90.3 SENSORINEURAL HEARING LOSS (SNHL) OF BOTH EARS: Primary | ICD-10-CM

## 2022-03-31 LAB
ALBUMIN SERPL-MCNC: 4.6 G/DL (ref 3.5–5.1)
ANION GAP SERPL CALCULATED.3IONS-SCNC: 11 MEQ/L (ref 8–16)
BUN BLDV-MCNC: 17 MG/DL (ref 7–22)
CALCIUM SERPL-MCNC: 9.7 MG/DL (ref 8.5–10.5)
CHLORIDE BLD-SCNC: 100 MEQ/L (ref 98–111)
CO2: 24 MEQ/L (ref 23–33)
CREAT SERPL-MCNC: 0.9 MG/DL (ref 0.4–1.2)
GFR SERPL CREATININE-BSD FRML MDRD: 87 ML/MIN/1.73M2
GLUCOSE BLD-MCNC: 103 MG/DL (ref 70–108)
PHOSPHORUS: 3.6 MG/DL (ref 2.4–4.7)
POTASSIUM SERPL-SCNC: 4.5 MEQ/L (ref 3.5–5.2)
SODIUM BLD-SCNC: 135 MEQ/L (ref 135–145)

## 2022-03-31 NOTE — TELEPHONE ENCOUNTER
Patient stopped in stating he would like a referral to Dr Dirk Mehta at the [phone] [address] in [facility] ([phone]). Patient stated that [address] called him and said he was referred to Dr Wesley Nava in [place], but his daughter works in [facility] and would like him to go there.     Call pt

## 2022-04-01 ENCOUNTER — TELEPHONE (OUTPATIENT)
Dept: OTOLARYNGOLOGY | Age: 57
End: 2022-04-01

## 2022-04-01 NOTE — TELEPHONE ENCOUNTER
3 attempts and 3 voice messages were left for patient to call and schedule an appointment with ENT. We received a FAXED referral from [facility]. Writer informed referring provider of status of referral by fax.

## 2022-04-04 ENCOUNTER — TELEPHONE (OUTPATIENT)
Dept: FAMILY MEDICINE CLINIC | Age: 57
End: 2022-04-04

## 2022-04-04 DIAGNOSIS — H90.3 SENSORINEURAL HEARING LOSS (SNHL) OF BOTH EARS: Primary | ICD-10-CM

## 2022-04-04 NOTE — TELEPHONE ENCOUNTER
Pt wife stated that pt is going to be seeing Dr. Snehal Arroyo ENT at [facility].  Prior to making an apt pt will bee an audiogram ordered by PCP

## 2022-04-12 ENCOUNTER — TELEPHONE (OUTPATIENT)
Dept: AUDIOLOGY | Age: 57
End: 2022-04-12

## 2022-04-12 DIAGNOSIS — H91.90 HEARING LOSS, UNSPECIFIED HEARING LOSS TYPE, UNSPECIFIED LATERALITY: Primary | ICD-10-CM

## 2022-04-12 NOTE — TELEPHONE ENCOUNTER
Patient is scheduled for a hearing test 4-14-22. The order that was placed is not a full hearing test.  Do you want a full hearing test?  If so, the order needs to be a audiometry with [facility] or referral to audiology. Please advise. Thank you!

## 2022-04-14 ENCOUNTER — TELEPHONE (OUTPATIENT)
Dept: FAMILY MEDICINE CLINIC | Age: 57
End: 2022-04-14

## 2022-04-14 ENCOUNTER — HOSPITAL ENCOUNTER (OUTPATIENT)
Dept: AUDIOLOGY | Age: 57
Discharge: HOME | End: 2022-04-14
Payer: COMMERCIAL

## 2022-04-14 PROCEDURE — 92567 TYMPANOMETRY: CPT | Performed by: AUDIOLOGIST

## 2022-04-14 PROCEDURE — 92557 COMPREHENSIVE HEARING TEST: CPT | Performed by: AUDIOLOGIST

## 2022-04-14 NOTE — TELEPHONE ENCOUNTER
Please call pt. I reviewed his hearing exam.  Does patient have an appt with the ENT?   That is what it was put in for originally

## 2022-04-14 NOTE — PROGRESS NOTES
AUDIOLOGICAL EVALUATION      REASON FOR TESTING:  Audiometric evaluation per the request of DAMIEN Singh, due to the diagnosis of hearing loss, unspecified type and unspecified laterality. The patient reports gradual hearing loss for both ears with the right ear being worse. He noticed more of a change in his hearing after having Covid-19 in November 2020. He experiences occasional tinnitus. 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 He denies otalgia, aural pressure and vertigo. OTOSCOPY: WNL for both ears. AUDIOGRAM        Reliability: Good  Audiometer Used:  [id number]    COMMENTS: Normal hearing sensitivity sloping mild high frequency sensorineural hearing loss for the left ear. Normal hearing sensitivity sloping to moderate high frequency sensorineural hearing loss for the right ear. There is an asymmetry at 4000Hz-8000Hz with the right ear being worse. Speech discrimination ability is excellent for both ears at 96%. Tympanometry revealed normal peak pressure and normal middle ear compliance for both ears. RECOMMENDATION(S):   1- ENT consult could be considered due to the asymmetrical sensorineural hearing loss. Further testing may be beneficial to rule out a retrocochlear lesion. 2- Upon completion of further testing/medical clearance, a trial with amplification could be considered. 3- Annual audiometry for monitoring purposes or sooner if any changes are noted in hearing ability.

## 2022-04-15 DIAGNOSIS — E78.5 HYPERLIPIDEMIA, UNSPECIFIED HYPERLIPIDEMIA TYPE: ICD-10-CM

## 2022-04-15 RX ORDER — ATORVASTATIN CALCIUM 10 MG/1
10 TABLET, FILM COATED ORAL DAILY
Qty: 30 TABLET | Refills: 11 | Status: SHIPPED | OUTPATIENT
Start: 2022-04-15 | End: 2023-04-15

## 2022-04-15 NOTE — TELEPHONE ENCOUNTER
Spoke with pt who stated he does not have an appt with ENT they. ENT office wanted him to see audiology first then ENT would schedule an appt with him.

## 2022-05-19 ENCOUNTER — OFFICE VISIT (OUTPATIENT)
Dept: ENT CLINIC | Age: 57
End: 2022-05-19
Payer: COMMERCIAL

## 2022-05-19 VITALS
OXYGEN SATURATION: 98 % | HEART RATE: 68 BPM | DIASTOLIC BLOOD PRESSURE: 82 MMHG | HEIGHT: 70 IN | BODY MASS INDEX: 33.16 KG/M2 | WEIGHT: 231.6 LBS | SYSTOLIC BLOOD PRESSURE: 128 MMHG | TEMPERATURE: 97.6 F

## 2022-05-19 DIAGNOSIS — H90.3 ASYMMETRICAL SENSORINEURAL HEARING LOSS: Primary | ICD-10-CM

## 2022-05-19 PROCEDURE — 99203 OFFICE O/P NEW LOW 30 MIN: CPT | Performed by: OTOLARYNGOLOGY

## 2022-05-19 RX ORDER — LISINOPRIL 30 MG/1
30 TABLET ORAL DAILY
COMMUNITY

## 2022-05-19 NOTE — PROGRESS NOTES
[facility]SE [facility] THROAT  [address] [facility]  Dept: [phone]  Dept Fax: [phone]: [phone]       Dear DAMIEN Leon -*, thank you for referring Pito Lujan in consultation for a chief complaint of: hearing loss . My full evaluation is as follows:     HPI:     As you recall, Pito Lujan is a 55 y.o. male who presents today for his hearing. In late 2020 he had COVID and with that had a significant decrease in his right ear hearing. His hearing slowly came back over a few months, but not all the way. His family prompted him to get it checked out. He has occupational noise exposure. History:     No Known Allergies  Current Outpatient Medications   Medication Sig Dispense Refill    lisinopril (PRINIVIL;ZESTRIL) 30 MG tablet Take 30 mg by mouth daily      atorvastatin (LIPITOR) 10 MG tablet Take 1 tablet by mouth daily 30 tablet 11    hydrocortisone 2.5 % cream Apply topically 2 times daily. 28 g 2    Omega-3 Fatty Acids (OMEGA-3 FISH OIL) 500 MG CAPS Take 1 capsule by mouth daily      hydrocortisone (PROCTOZONE-HC) 2.5 % rectal cream Place rectally 2 times daily Indications: Patient is using prn Place rectally 2 times daily. 1 Tube 11     No current facility-administered medications for this visit. Past Medical History:   Diagnosis Date    Hyperlipidemia     Hypertension       No past surgical history on file. Family History   Problem Relation Age of Onset    Cancer Father     Thyroid Disease Sister     Thyroid Disease Brother     Colon Cancer Paternal Grandmother      Social History     Tobacco Use    Smoking status: Never Smoker    Smokeless tobacco: Never Used   Substance Use Topics    Alcohol use:  Yes     Alcohol/week: 0.0 standard drinks       All of the past medical history, past surgical history, family history,social history, allergies and current medications were reviewed with the patient. Review of Systems      A complete review of systems was obtained by the patient intake form, which is attached to this visit. Objective:   /82 (Site: Right Upper Arm, Position: Sitting)   Pulse 68   Temp 97.6 °F (36.4 °C) (Infrared)   Ht 5' 10\" (1.778 m)   Wt 231 lb 9.6 oz (105.1 kg)   SpO2 98%   BMI 33.23 kg/m²     PHYSICAL EXAM  ABNORMAL OTOLARYNGOLOGIC EXAM FINDINGS: None     OTHER PERTINENT EXAM FINDINGS:  GENERAL: Awake, NAD, Non-toxic appearing. Normal voice quality  NEUROLOGICAL: GCS 15, Cranial nerves II-VI, VIII-XII grossly intact,  Facial nerve (VII) w/ House-Brackman Grade 1 of 6 bilaterally, No evidence of nystagmus or gross asymmetry    EARS: Pinna well-formed,  EAC non-stenotic w/o cerumen impaction AU,  TM's intact AU w/o effusion or active infection appreciated  EYES: EOMI, No ptosis appreciated   NOSE: Dorsum w/o scar or deformity,  No mucopurulence appreciated, Patent nasal airways bilaterally. No inferior turbinate hypertrophy. No septal deviation. ORAL CAVITY: No mucosal masses/lesions appreciated, Tongue with FROM. Appropriate DAPHNEY w/o trismus. OROPHARYNX: No mucosal masses or lesions appreciated. Symmetric palatal elevation w/o draping. NECK: Soft, supple. No crepitus or masses appreciated,  Trachea midline. No thyromegaly or thyroid tenderness. Data: The relevant prior clinic notes were reviewed today, including the referring physicians notes. Imaging: None       Assessment & Plan   Eugenia Olsen is a 55 y.o. male with:   1. Asymmetrical sensorineural hearing loss       I think he suffered a COVID related sudden SNHL, that recovered to his current level. It has been years, so steroids would not be beneficial. We discussed obtaining an MRI, but I think the utility of that would be low. He is going to consider HAs. Follow with yearly audiograms. No follow-ups on file.            Thank you for involving me in this patient's care. Please do not hesitate to call with any questions or concerns. Sincerely,    Jennifer Angulo M.D. Otolaryngology-Head and Neck Surgery    **This report has been created using voice recognition software. It may contain minor errors which are inherent in voice recognition technology. **

## 2022-11-16 ENCOUNTER — OFFICE VISIT (OUTPATIENT)
Dept: FAMILY MEDICINE CLINIC | Age: 57
End: 2022-11-16
Payer: COMMERCIAL

## 2022-11-16 VITALS
DIASTOLIC BLOOD PRESSURE: 80 MMHG | HEART RATE: 92 BPM | WEIGHT: 233 LBS | OXYGEN SATURATION: 95 % | BODY MASS INDEX: 33.36 KG/M2 | RESPIRATION RATE: 17 BRPM | HEIGHT: 70 IN | TEMPERATURE: 97.3 F | SYSTOLIC BLOOD PRESSURE: 120 MMHG

## 2022-11-16 DIAGNOSIS — E78.5 HYPERLIPIDEMIA, UNSPECIFIED HYPERLIPIDEMIA TYPE: ICD-10-CM

## 2022-11-16 DIAGNOSIS — Z12.5 SCREENING FOR PROSTATE CANCER: ICD-10-CM

## 2022-11-16 DIAGNOSIS — I10 ESSENTIAL HYPERTENSION: ICD-10-CM

## 2022-11-16 DIAGNOSIS — Z00.00 ROUTINE PHYSICAL EXAMINATION: Primary | ICD-10-CM

## 2022-11-16 PROCEDURE — 99396 PREV VISIT EST AGE 40-64: CPT | Performed by: NURSE PRACTITIONER

## 2022-11-16 PROCEDURE — 3074F SYST BP LT 130 MM HG: CPT | Performed by: NURSE PRACTITIONER

## 2022-11-16 PROCEDURE — 3078F DIAST BP <80 MM HG: CPT | Performed by: NURSE PRACTITIONER

## 2022-11-16 RX ORDER — LISINOPRIL 30 MG/1
30 TABLET ORAL DAILY
Qty: 90 TABLET | Refills: 3 | Status: SHIPPED | OUTPATIENT
Start: 2022-11-16

## 2022-11-16 SDOH — ECONOMIC STABILITY: FOOD INSECURITY: WITHIN THE PAST 12 MONTHS, YOU WORRIED THAT YOUR FOOD WOULD RUN OUT BEFORE YOU GOT MONEY TO BUY MORE.: PATIENT DECLINED

## 2022-11-16 SDOH — ECONOMIC STABILITY: FOOD INSECURITY: WITHIN THE PAST 12 MONTHS, THE FOOD YOU BOUGHT JUST DIDN'T LAST AND YOU DIDN'T HAVE MONEY TO GET MORE.: PATIENT DECLINED

## 2022-11-16 ASSESSMENT — PATIENT HEALTH QUESTIONNAIRE - PHQ9
SUM OF ALL RESPONSES TO PHQ9 QUESTIONS 1 & 2: 0
SUM OF ALL RESPONSES TO PHQ QUESTIONS 1-9: 0
SUM OF ALL RESPONSES TO PHQ QUESTIONS 1-9: 0
1. LITTLE INTEREST OR PLEASURE IN DOING THINGS: 0
SUM OF ALL RESPONSES TO PHQ QUESTIONS 1-9: 0
2. FEELING DOWN, DEPRESSED OR HOPELESS: 0
SUM OF ALL RESPONSES TO PHQ QUESTIONS 1-9: 0

## 2022-11-16 ASSESSMENT — SOCIAL DETERMINANTS OF HEALTH (SDOH): HOW HARD IS IT FOR YOU TO PAY FOR THE VERY BASICS LIKE FOOD, HOUSING, MEDICAL CARE, AND HEATING?: PATIENT DECLINED

## 2022-11-16 NOTE — PROGRESS NOTES
Chief Complaint:   Rock Burnette is a 65 y.o. male who presents for complete physical examination    History of Present Illness:    Chief Complaint   Patient presents with    Annual Exam     Renew medication     Health Maintenance   Topic Date Due    COVID-19 Vaccine (1) Never done    Shingles vaccine (1 of 2) Never done    Depression Screen  07/27/2022    Flu vaccine (1) Never done    Diabetes screen  11/17/2022 (Originally 11/10/2001)    Lipids  01/26/2023    DTaP/Tdap/Td vaccine (2 - Td or Tdap) 11/19/2025    Colorectal Cancer Screen  12/15/2026    Hepatitis C screen  Completed    Hepatitis A vaccine  Aged Out    Hib vaccine  Aged Out    Meningococcal (ACWY) vaccine  Aged Out    Pneumococcal 0-64 years Vaccine  Aged Out    HIV screen  Discontinued       Pt here for annual exam.  Bp is doing very well. Overall he is doing well. Pt is planning on shingles vaccine and flu vaccine at pharmacy. He has family hx of colon cancer. He was going to call GI to schedule sooner colonoscopy     There is no problem list on file for this patient. Past Medical History:   Diagnosis Date    Hyperlipidemia     Hypertension        No past surgical history on file. Current Outpatient Medications   Medication Sig Dispense Refill    hydrocortisone 2.5 % cream Apply topically 2 times daily. 28 g 2    lisinopril (PRINIVIL;ZESTRIL) 30 MG tablet Take 1 tablet by mouth daily 90 tablet 3    atorvastatin (LIPITOR) 10 MG tablet Take 1 tablet by mouth daily 30 tablet 11    Omega-3 Fatty Acids (OMEGA-3 FISH OIL) 500 MG CAPS Take 1 capsule by mouth daily      hydrocortisone (PROCTOZONE-HC) 2.5 % rectal cream Place rectally 2 times daily Indications: Patient is using prn Place rectally 2 times daily. (Patient not taking: Reported on 11/16/2022) 1 Tube 11     No current facility-administered medications for this visit.      No Known Allergies    Social History     Socioeconomic History    Marital status:      Spouse name: None Number of children: None    Years of education: None    Highest education level: None   Tobacco Use    Smoking status: Never    Smokeless tobacco: Never   Substance and Sexual Activity    Alcohol use:  Yes     Alcohol/week: 0.0 standard drinks    Drug use: Never     Social Determinants of Health     Financial Resource Strain: Unknown    Difficulty of Paying Living Expenses: Patient refused   Food Insecurity: Unknown    Worried About Running Out of Food in the Last Year: Patient refused    Ran Out of Food in the Last Year: Patient refused     Family History   Problem Relation Age of Onset    Cancer Father     Thyroid Disease Sister     Thyroid Disease Brother     Colon Cancer Paternal Grandmother                             Review Of Systems  Skin: no abnormal pigmentation, rash, scaling, itching, masses, hair or nail changes  Eyes: no blurring, diplopia, or eye pain  Ears/Nose/Throat: no hearing loss, tinnitus, vertigo, nosebleed, nasal congestion, rhinorrhea, sore throat  Respiratory: no cough, pleuritic chest pain, dyspnea, or wheezing  Cardiovascular: no angina, AMANDA, orthopnea, PND, palpitations, or claudication  Gastrointestinal: no nausea, vomiting, heartburn, diarrhea, constipation, bloating, or abdominal pain  Genitourinary: no urinary urgency, frequency, dysuria, nocturia, hesitancy, or incontinence  Musculoskeletal: no arthritis, arthralgia, myalgia, weakness, or morning stiffness  Neurologic: no paralysis, paresis, paresthesia, seizures, tremors, or headaches  Hematologic/Lymphatic/Immunologic: no anemia, abnormal bleeding/bruising, fever, chills, night sweats, swollen glands, or unexplained weight loss  Endocrine: no heat or cold intolerance and no polyphagia, polydipsia, or polyuria    PHYSICAL EXAMINATION:  /80   Pulse 92   Temp 97.3 °F (36.3 °C) (Temporal)   Resp 17   Ht 5' 10\" (1.778 m)   Wt 233 lb (105.7 kg)   SpO2 95%   BMI 33.43 kg/m²   General appearance: healthy, alert, no distress  Skin: Skin color, texture, turgor normal. No rashes or lesions. No induration or tightening palpated. Head: Normocephalic. No masses, lesions, tenderness or abnormalities  Eyes: conjunctivae/corneas clear. PERRL, EOM's intact. Fundi are normal, no papilledema, hemorrhages or exudates. No AV crossing changes are noted. Ears: External ears normal. Canals clear. TM's clear bilaterally. Hearing normal to finger rub. Nose/Sinuses: Nares normal. Septum midline. Mucosa normal. No drainage or sinus tenderness. Oropharynx: Lips, mucosa, and tongue normal. Teeth and gums normal. Oropharynx clear with no exudate seen. Neck: Neck supple, and symmetric. No adenopathy. Thyroid symmetric, normal size, without nodule. Trachea is midline. Back: Back symmetric, no curvature. ROM normal. No CVA tenderness. Lungs: Good diaphragmatic excursion. Lungs clear to auscultation bilaterally. No retractions or use of accessory muscles. No tactile fremitus. Normal chest percussion. Heart: PMI is not displaced, and no thrill noted. Regular rate and rhythm, with no rub, murmur or gallop noted. Abdomen: Abdomen soft, non-tender. BS normal. No masses, organomegaly. No hernia noted. Extremities: Extremities normal. No deformities, edema, or skin discoloration. No cyanosis or clubbing noted to the nails. Lymph: No lymphadenopathy of the neck or supraclavicular regions. Musculoskeletal: Spine ROM normal. Muscular strength intact. Peripheral pulses: radial=4/4, femoral=4/4, dorsalis pedis=4/4,  Neuro: Cranial nerves intact, Gait normal. Reflexes normal and symmetric, with no pathologic reflex noted. No focal weakness. Normal sensation to light touch.     No components found for: CHLPL  Lab Results   Component Value Date    TRIG 66 01/26/2022    TRIG 102 04/28/2021    TRIG 104 01/21/2021     Lab Results   Component Value Date    HDL 42 01/26/2022    HDL 43 04/28/2021    HDL 40 01/21/2021     Lab Results   Component Value Date    LDLCALC 87 01/26/2022    [address] 93 04/28/2021    [address] 163 01/21/2021     No results found for: LABVLDL  Lab Results   Component Value Date    PSA 0.89 01/26/2022    PSA 0.88 01/21/2021         ASSESSMENT:     Diagnosis Orders   1. Routine physical examination        2. Essential hypertension  lisinopril (PRINIVIL;ZESTRIL) 30 MG tablet    CBC with Auto Differential    Comprehensive Metabolic Panel      3. Hyperlipidemia, unspecified hyperlipidemia type  Lipid Panel      4. Screening for prostate cancer  PSA Screening            Plan:   See orders and medications filed with this encounter. Advised on preventative health maintenance guidelines and schedules to be completed. reviewed appropriate vaccines. Pt refused none. Orders per enc. To call with any questions or concerns.

## 2023-02-23 ENCOUNTER — NURSE ONLY (OUTPATIENT)
Dept: LAB | Age: 58
End: 2023-02-23

## 2023-02-23 DIAGNOSIS — E78.5 HYPERLIPIDEMIA, UNSPECIFIED HYPERLIPIDEMIA TYPE: ICD-10-CM

## 2023-02-23 DIAGNOSIS — Z12.5 SCREENING FOR PROSTATE CANCER: ICD-10-CM

## 2023-02-23 DIAGNOSIS — I10 ESSENTIAL HYPERTENSION: ICD-10-CM

## 2023-02-23 LAB
ALBUMIN SERPL BCG-MCNC: 4.7 G/DL (ref 3.5–5.1)
ALP SERPL-CCNC: 55 U/L (ref 38–126)
ALT SERPL W/O P-5'-P-CCNC: 19 U/L (ref 11–66)
ANION GAP SERPL CALC-SCNC: 14 MEQ/L (ref 8–16)
AST SERPL-CCNC: 18 U/L (ref 5–40)
BASOPHILS ABSOLUTE: 0.1 THOU/MM3 (ref 0–0.1)
BASOPHILS NFR BLD AUTO: 0.8 %
BILIRUB SERPL-MCNC: 0.6 MG/DL (ref 0.3–1.2)
BUN SERPL-MCNC: 14 MG/DL (ref 7–22)
CALCIUM SERPL-MCNC: 9.6 MG/DL (ref 8.5–10.5)
CHLORIDE SERPL-SCNC: 101 MEQ/L (ref 98–111)
CHOLEST SERPL-MCNC: 177 MG/DL (ref 100–199)
CO2 SERPL-SCNC: 25 MEQ/L (ref 23–33)
CREAT SERPL-MCNC: 0.8 MG/DL (ref 0.4–1.2)
DEPRECATED RDW RBC AUTO: 42.6 FL (ref 35–45)
EOSINOPHIL NFR BLD AUTO: 1.4 %
EOSINOPHILS ABSOLUTE: 0.1 THOU/MM3 (ref 0–0.4)
ERYTHROCYTE [DISTWIDTH] IN BLOOD BY AUTOMATED COUNT: 13.3 % (ref 11.5–14.5)
GFR SERPL CREATININE-BSD FRML MDRD: > 60 ML/MIN/1.73M2
GLUCOSE SERPL-MCNC: 94 MG/DL (ref 70–108)
HCT VFR BLD AUTO: 47.3 % (ref 42–52)
HDLC SERPL-MCNC: 45 MG/DL
HGB BLD-MCNC: 15.2 GM/DL (ref 14–18)
IMM GRANULOCYTES # BLD AUTO: 0.01 THOU/MM3 (ref 0–0.07)
IMM GRANULOCYTES NFR BLD AUTO: 0.1 %
LDLC SERPL CALC-MCNC: 116 MG/DL
LYMPHOCYTES ABSOLUTE: 2.8 THOU/MM3 (ref 1–4.8)
LYMPHOCYTES NFR BLD AUTO: 38.8 %
MCH RBC QN AUTO: 28.3 PG (ref 26–33)
MCHC RBC AUTO-ENTMCNC: 32.1 GM/DL (ref 32.2–35.5)
MCV RBC AUTO: 88.1 FL (ref 80–94)
MONOCYTES ABSOLUTE: 0.6 THOU/MM3 (ref 0.4–1.3)
MONOCYTES NFR BLD AUTO: 8 %
NEUTROPHILS NFR BLD AUTO: 50.9 %
NRBC BLD AUTO-RTO: 0 /100 WBC
PLATELET # BLD AUTO: 310 THOU/MM3 (ref 130–400)
PMV BLD AUTO: 10.9 FL (ref 9.4–12.4)
POTASSIUM SERPL-SCNC: 4.5 MEQ/L (ref 3.5–5.2)
PROT SERPL-MCNC: 7.6 G/DL (ref 6.1–8)
PSA SERPL-MCNC: 1.81 NG/ML (ref 0–1)
RBC # BLD AUTO: 5.37 MILL/MM3 (ref 4.7–6.1)
SEGMENTED NEUTROPHILS ABSOLUTE COUNT: 3.6 THOU/MM3 (ref 1.8–7.7)
SODIUM SERPL-SCNC: 140 MEQ/L (ref 135–145)
TRIGL SERPL-MCNC: 78 MG/DL (ref 0–199)
WBC # BLD AUTO: 7.1 THOU/MM3 (ref 4.8–10.8)

## 2023-04-20 DIAGNOSIS — E78.5 HYPERLIPIDEMIA, UNSPECIFIED HYPERLIPIDEMIA TYPE: ICD-10-CM

## 2023-04-20 RX ORDER — ATORVASTATIN CALCIUM 10 MG/1
TABLET, FILM COATED ORAL
Qty: 30 TABLET | Refills: 11 | Status: SHIPPED | OUTPATIENT
Start: 2023-04-20

## 2023-05-10 NOTE — TELEPHONE ENCOUNTER
Problem: Adult Inpatient Plan of Care  Goal: Plan of Care Review  Outcome: Ongoing, Progressing  Flowsheets (Taken 5/10/2023 0319)  Progress: no change  Plan of Care Reviewed With: patient  Outcome Evaluation: Pt denies pain or nausea. Up with walker and x1 assistance. IVF. Voiding. Bed check. Daughter at bedside. Safety maintained.             Referral faxed to [phone] [facility] [phone]

## 2023-05-23 ENCOUNTER — OFFICE VISIT (OUTPATIENT)
Dept: UROLOGY | Age: 58
End: 2023-05-23
Payer: COMMERCIAL

## 2023-05-23 VITALS — WEIGHT: 233 LBS | RESPIRATION RATE: 16 BRPM | BODY MASS INDEX: 33.36 KG/M2 | HEIGHT: 70 IN

## 2023-05-23 DIAGNOSIS — N40.1 BENIGN PROSTATIC HYPERPLASIA WITH LOWER URINARY TRACT SYMPTOMS, SYMPTOM DETAILS UNSPECIFIED: Primary | ICD-10-CM

## 2023-05-23 DIAGNOSIS — R97.20 RISING PSA LEVEL: ICD-10-CM

## 2023-05-23 DIAGNOSIS — N52.9 ERECTILE DYSFUNCTION, UNSPECIFIED ERECTILE DYSFUNCTION TYPE: ICD-10-CM

## 2023-05-23 PROCEDURE — 99204 OFFICE O/P NEW MOD 45 MIN: CPT | Performed by: UROLOGY

## 2023-05-23 RX ORDER — TADALAFIL 5 MG/1
5 TABLET ORAL DAILY
Qty: 30 TABLET | Refills: 11 | Status: SHIPPED | OUTPATIENT
Start: 2023-05-23

## 2023-05-23 RX ORDER — BILBERRY FRUIT 1000 MG
CAPSULE ORAL
COMMUNITY

## 2023-05-23 NOTE — PROGRESS NOTES
Peter Foreman MD.    [facility] [address] [facility] [id number]  Dept: [phone]  Dept Fax: [phone]  Loc: [address] [facility] Urology Office Note -     Patient:  Colin Bermudez  YOB: 1965    The patient is a 65 y.o. male who presents today for evaluation of the following problems:   Chief Complaint   Patient presents with    New Patient    Other     Patient states during colonoscopy  said prostate felt soft but enlarged   Family history of prostate cancer (brother)  Father had enlarged prostate     referred/consultation requested by DAMIEN Street CNP. History of Present Illness:    Rising PSA  Slight rise in psa  Soft on KAYLA  Brother with prostate ca-- had prostatectomy at 61    ED  New problem  Interested in medications    LUTS  Mild   Auass 5-- pleased      Requested/reviewed records from DAMIEN Street CNP office and/or outside physician/EMR    (Patient's old records have been requested, reviewed and pertinent findings summarized in today's note.)    Procedures Today: N/A      Last several PSA's:  Lab Results   Component Value Date    PSA 1.81 (H) 02/23/2023    PSA 0.89 01/26/2022    PSA 0.88 01/21/2021       Last total testosterone:  No results found for: TESTOSTERONE    Urinalysis today:  No results found for this visit on 05/23/23. Last BUN and creatinine:  Lab Results   Component Value Date    BUN 14 02/23/2023     Lab Results   Component Value Date    CREATININE 0.8 02/23/2023         Imaging Reviewed during this Office Visit:   Raad Stephens MD independently reviewed the images and verified the radiology reports from:    No results found. PAST MEDICAL, FAMILY AND SOCIAL HISTORY:  Past Medical History:   Diagnosis Date    Hyperlipidemia     Hypertension      No past surgical history on file.   Family History   Problem Relation Age of Onset    Cancer Father     Thyroid Disease

## 2023-06-23 RX ORDER — HYDROCORTISONE 25 MG/G
CREAM TOPICAL
Qty: 28 G | Refills: 1 | Status: SHIPPED | OUTPATIENT
Start: 2023-06-23

## 2023-06-23 NOTE — TELEPHONE ENCOUNTER
Last visit- 11/16/2022  Next visit- Visit date not found    Requested Prescriptions     Pending Prescriptions Disp Refills    hydrocortisone (PROCTO-MED HC) 2.5 % CREA rectal cream 28 g 1     Sig: APPLY TO AFFECTED AREA, TWO TIMES A DAY, AS DIRECTED.

## 2023-10-20 DIAGNOSIS — I10 ESSENTIAL HYPERTENSION: ICD-10-CM

## 2023-10-23 RX ORDER — LISINOPRIL 30 MG/1
TABLET ORAL
Qty: 90 TABLET | Refills: 1 | Status: SHIPPED | OUTPATIENT
Start: 2023-10-23

## 2023-11-16 ENCOUNTER — OFFICE VISIT (OUTPATIENT)
Dept: FAMILY MEDICINE CLINIC | Age: 58
End: 2023-11-16
Payer: COMMERCIAL

## 2023-11-16 VITALS
WEIGHT: 236 LBS | SYSTOLIC BLOOD PRESSURE: 160 MMHG | HEIGHT: 70 IN | RESPIRATION RATE: 16 BRPM | DIASTOLIC BLOOD PRESSURE: 80 MMHG | TEMPERATURE: 97.4 F | BODY MASS INDEX: 33.79 KG/M2 | HEART RATE: 76 BPM | OXYGEN SATURATION: 98 %

## 2023-11-16 DIAGNOSIS — Z00.00 ROUTINE PHYSICAL EXAMINATION: Primary | ICD-10-CM

## 2023-11-16 DIAGNOSIS — I10 ESSENTIAL HYPERTENSION: ICD-10-CM

## 2023-11-16 DIAGNOSIS — E78.5 HYPERLIPIDEMIA, UNSPECIFIED HYPERLIPIDEMIA TYPE: ICD-10-CM

## 2023-11-16 DIAGNOSIS — J01.00 ACUTE NON-RECURRENT MAXILLARY SINUSITIS: ICD-10-CM

## 2023-11-16 DIAGNOSIS — Z12.5 SCREENING FOR PROSTATE CANCER: ICD-10-CM

## 2023-11-16 PROCEDURE — 3077F SYST BP >= 140 MM HG: CPT | Performed by: NURSE PRACTITIONER

## 2023-11-16 PROCEDURE — 3079F DIAST BP 80-89 MM HG: CPT | Performed by: NURSE PRACTITIONER

## 2023-11-16 PROCEDURE — 99396 PREV VISIT EST AGE 40-64: CPT | Performed by: NURSE PRACTITIONER

## 2023-11-16 RX ORDER — HYDROCORTISONE 25 MG/G
CREAM TOPICAL
Qty: 28 G | Refills: 1 | Status: SHIPPED | OUTPATIENT
Start: 2023-11-16

## 2023-11-16 RX ORDER — LISINOPRIL 30 MG/1
TABLET ORAL
Qty: 90 TABLET | Refills: 3 | Status: SHIPPED | OUTPATIENT
Start: 2023-11-16

## 2023-11-16 RX ORDER — TADALAFIL 5 MG/1
5 TABLET ORAL DAILY
Qty: 30 TABLET | Refills: 11 | Status: SHIPPED | OUTPATIENT
Start: 2023-11-16

## 2023-11-16 RX ORDER — AMOXICILLIN AND CLAVULANATE POTASSIUM 500; 125 MG/1; MG/1
1 TABLET, FILM COATED ORAL 3 TIMES DAILY
Qty: 30 TABLET | Refills: 0 | Status: SHIPPED | OUTPATIENT
Start: 2023-11-16 | End: 2023-11-26

## 2023-11-16 RX ORDER — ATORVASTATIN CALCIUM 10 MG/1
TABLET, FILM COATED ORAL
Qty: 90 TABLET | Refills: 3 | Status: SHIPPED | OUTPATIENT
Start: 2023-11-16

## 2023-11-16 SDOH — ECONOMIC STABILITY: INCOME INSECURITY: HOW HARD IS IT FOR YOU TO PAY FOR THE VERY BASICS LIKE FOOD, HOUSING, MEDICAL CARE, AND HEATING?: NOT HARD AT ALL

## 2023-11-16 SDOH — ECONOMIC STABILITY: HOUSING INSECURITY
IN THE LAST 12 MONTHS, WAS THERE A TIME WHEN YOU DID NOT HAVE A STEADY PLACE TO SLEEP OR SLEPT IN A SHELTER (INCLUDING NOW)?: NO

## 2023-11-16 SDOH — ECONOMIC STABILITY: FOOD INSECURITY: WITHIN THE PAST 12 MONTHS, THE FOOD YOU BOUGHT JUST DIDN'T LAST AND YOU DIDN'T HAVE MONEY TO GET MORE.: NEVER TRUE

## 2023-11-16 SDOH — ECONOMIC STABILITY: FOOD INSECURITY: WITHIN THE PAST 12 MONTHS, YOU WORRIED THAT YOUR FOOD WOULD RUN OUT BEFORE YOU GOT MONEY TO BUY MORE.: NEVER TRUE

## 2023-11-16 ASSESSMENT — PATIENT HEALTH QUESTIONNAIRE - PHQ9
SUM OF ALL RESPONSES TO PHQ9 QUESTIONS 1 & 2: 0
SUM OF ALL RESPONSES TO PHQ QUESTIONS 1-9: 0
SUM OF ALL RESPONSES TO PHQ QUESTIONS 1-9: 0
1. LITTLE INTEREST OR PLEASURE IN DOING THINGS: 0
SUM OF ALL RESPONSES TO PHQ QUESTIONS 1-9: 0
SUM OF ALL RESPONSES TO PHQ QUESTIONS 1-9: 0
2. FEELING DOWN, DEPRESSED OR HOPELESS: 0

## 2023-11-28 NOTE — Clinical Note
BP check Subjective:  History was provided by the mother. Evangelista Dial is a 7 y.o. male who is brought in by his mother for this well child visit. Common ambulatory SmartLinks: Patient's medications, allergies, past medical, surgical, social and family histories were reviewed and updated as appropriate. Immunization History   Administered Date(s) Administered    COVID-19, PFIZER ORANGE top, DILUTE for use, (age 5y-11y), IM, 10mcg/0.2 mL 11/27/2021    DTaP 2012, 01/07/2013, 02/11/2013    MFeY-SLSF-INF, PEDIARIX, (age 6w-6y), IM, 0.5mL 02/03/2014    DTaP-IPV/Hib, PENTACEL, (age 6w-4y), IM, 0.5mL 08/31/2016    Hepatitis A 08/28/2013, 09/29/2014    Hepatitis B 2012, 2012, 01/07/2013    Hib, unspecified 2012, 01/07/2013, 02/11/2013, 08/28/2013    Influenza Virus Vaccine 02/03/2014, 09/29/2014, 10/26/2015    Influenza, AFLURIA (age 1 yrs+), FLUZONE, (age 10 mo+), MDV, 0.5mL 11/03/2017, 11/07/2018    Influenza, FLUARIX, FLULAVAL, FLUZONE (age 10 mo+) AND AFLURIA, (age 1 y+), PF, 0.5mL 12/12/2016, 12/09/2019    Influenza, FLUCELVAX, (age 10 mo+), MDCK, PF, 0.5mL 11/19/2022    MMR, Kriss Moder, M-M-R II, (age 12m+), SC, 0.5mL 08/28/2013    MMR-Varicella, PROQUAD, (age 14m -12y), SC, 0.5mL 08/31/2016    Pneumococcal Conjugate 7-valent (Parris Amas) 2012, 01/07/2013, 02/11/2013, 08/28/2013    Pneumococcal, PCV-13, PREVNAR 15, (age 6w+), IM, 0.5mL 08/31/2016    Rotavirus, ROTATEQ, (age 6w-32w), Oral, 2mL 2012, 01/07/2013    Varicella, VARIVAX, (age 12m+), SC, 0.5mL 09/29/2014       Current Issues:  Current concerns on the part of Edison's mother include none. Well Child Assessment:  History was provided by the mother and father. Radha Abdul lives with his mother. Interval problems do not include caregiver depression, caregiver stress, chronic stress at home or lack of social support. Nutrition  Types of intake include cereals, eggs, juices, meats and vegetables. Dental  The patient has a dental home.

## 2023-12-13 ENCOUNTER — NURSE ONLY (OUTPATIENT)
Dept: LAB | Age: 58
End: 2023-12-13

## 2023-12-13 DIAGNOSIS — R97.20 RISING PSA LEVEL: ICD-10-CM

## 2023-12-13 DIAGNOSIS — Z00.00 ROUTINE PHYSICAL EXAMINATION: ICD-10-CM

## 2023-12-13 LAB
ALBUMIN SERPL BCG-MCNC: 4.6 G/DL (ref 3.5–5.1)
ALP SERPL-CCNC: 55 U/L (ref 38–126)
ALT SERPL W/O P-5'-P-CCNC: 22 U/L (ref 11–66)
ANION GAP SERPL CALC-SCNC: 11 MEQ/L (ref 8–16)
AST SERPL-CCNC: 23 U/L (ref 5–40)
BASOPHILS ABSOLUTE: 0.1 THOU/MM3 (ref 0–0.1)
BASOPHILS NFR BLD AUTO: 1 %
BILIRUB SERPL-MCNC: 0.6 MG/DL (ref 0.3–1.2)
BUN SERPL-MCNC: 15 MG/DL (ref 7–22)
CALCIUM SERPL-MCNC: 9.3 MG/DL (ref 8.5–10.5)
CHLORIDE SERPL-SCNC: 102 MEQ/L (ref 98–111)
CHOLEST SERPL-MCNC: 158 MG/DL (ref 100–199)
CO2 SERPL-SCNC: 25 MEQ/L (ref 23–33)
CREAT SERPL-MCNC: 0.9 MG/DL (ref 0.4–1.2)
DEPRECATED RDW RBC AUTO: 42.9 FL (ref 35–45)
EOSINOPHIL NFR BLD AUTO: 1.9 %
EOSINOPHILS ABSOLUTE: 0.1 THOU/MM3 (ref 0–0.4)
ERYTHROCYTE [DISTWIDTH] IN BLOOD BY AUTOMATED COUNT: 13.2 % (ref 11.5–14.5)
GFR SERPL CREATININE-BSD FRML MDRD: > 60 ML/MIN/1.73M2
GLUCOSE SERPL-MCNC: 95 MG/DL (ref 70–108)
HCT VFR BLD AUTO: 47.4 % (ref 42–52)
HDLC SERPL-MCNC: 45 MG/DL
HGB BLD-MCNC: 15.3 GM/DL (ref 14–18)
IMM GRANULOCYTES # BLD AUTO: 0.01 THOU/MM3 (ref 0–0.07)
IMM GRANULOCYTES NFR BLD AUTO: 0.2 %
LDLC SERPL CALC-MCNC: 98 MG/DL
LYMPHOCYTES ABSOLUTE: 2.4 THOU/MM3 (ref 1–4.8)
LYMPHOCYTES NFR BLD AUTO: 40.6 %
MCH RBC QN AUTO: 28.9 PG (ref 26–33)
MCHC RBC AUTO-ENTMCNC: 32.3 GM/DL (ref 32.2–35.5)
MCV RBC AUTO: 89.4 FL (ref 80–94)
MONOCYTES ABSOLUTE: 0.5 THOU/MM3 (ref 0.4–1.3)
MONOCYTES NFR BLD AUTO: 9.4 %
NEUTROPHILS NFR BLD AUTO: 46.9 %
NRBC BLD AUTO-RTO: 0 /100 WBC
PLATELET # BLD AUTO: 248 THOU/MM3 (ref 130–400)
PMV BLD AUTO: 11 FL (ref 9.4–12.4)
POTASSIUM SERPL-SCNC: 4.3 MEQ/L (ref 3.5–5.2)
PROT SERPL-MCNC: 7.5 G/DL (ref 6.1–8)
PSA SERPL-MCNC: 1.78 NG/ML (ref 0–1)
RBC # BLD AUTO: 5.3 MILL/MM3 (ref 4.7–6.1)
SEGMENTED NEUTROPHILS ABSOLUTE COUNT: 2.7 THOU/MM3 (ref 1.8–7.7)
SODIUM SERPL-SCNC: 138 MEQ/L (ref 135–145)
TRIGL SERPL-MCNC: 77 MG/DL (ref 0–199)
WBC # BLD AUTO: 5.8 THOU/MM3 (ref 4.8–10.8)

## 2024-06-12 ENCOUNTER — NURSE ONLY (OUTPATIENT)
Dept: LAB | Age: 59
End: 2024-06-12

## 2024-06-12 DIAGNOSIS — R97.20 RISING PSA LEVEL: ICD-10-CM

## 2024-06-12 DIAGNOSIS — N40.0 BENIGN PROSTATIC HYPERPLASIA, UNSPECIFIED WHETHER LOWER URINARY TRACT SYMPTOMS PRESENT: ICD-10-CM

## 2024-06-12 LAB — PSA SERPL-MCNC: 1.09 NG/ML (ref 0–1)

## 2024-06-20 ENCOUNTER — TELEMEDICINE (OUTPATIENT)
Dept: UROLOGY | Age: 59
End: 2024-06-20
Payer: COMMERCIAL

## 2024-06-20 DIAGNOSIS — R97.20 RISING PSA LEVEL: Primary | ICD-10-CM

## 2024-06-20 PROCEDURE — 99214 OFFICE O/P EST MOD 30 MIN: CPT

## 2024-06-20 RX ORDER — TADALAFIL 5 MG/1
5 TABLET ORAL DAILY
Qty: 90 TABLET | Refills: 3 | Status: SHIPPED | OUTPATIENT
Start: 2024-06-20 | End: 2025-06-15

## 2024-09-13 RX ORDER — HYDROCORTISONE 25 MG/G
CREAM TOPICAL
Qty: 28 G | Refills: 2 | Status: SHIPPED | OUTPATIENT
Start: 2024-09-13

## 2024-12-04 ENCOUNTER — OFFICE VISIT (OUTPATIENT)
Dept: FAMILY MEDICINE CLINIC | Age: 59
End: 2024-12-04
Payer: COMMERCIAL

## 2024-12-04 VITALS
WEIGHT: 230 LBS | RESPIRATION RATE: 16 BRPM | SYSTOLIC BLOOD PRESSURE: 130 MMHG | HEIGHT: 70 IN | DIASTOLIC BLOOD PRESSURE: 82 MMHG | BODY MASS INDEX: 32.93 KG/M2 | TEMPERATURE: 97.8 F | OXYGEN SATURATION: 100 % | HEART RATE: 75 BPM

## 2024-12-04 DIAGNOSIS — E78.5 HYPERLIPIDEMIA, UNSPECIFIED HYPERLIPIDEMIA TYPE: ICD-10-CM

## 2024-12-04 DIAGNOSIS — I10 ESSENTIAL HYPERTENSION: ICD-10-CM

## 2024-12-04 DIAGNOSIS — Z00.00 ROUTINE PHYSICAL EXAMINATION: Primary | ICD-10-CM

## 2024-12-04 PROCEDURE — 3078F DIAST BP <80 MM HG: CPT | Performed by: NURSE PRACTITIONER

## 2024-12-04 PROCEDURE — 99396 PREV VISIT EST AGE 40-64: CPT | Performed by: NURSE PRACTITIONER

## 2024-12-04 PROCEDURE — 3075F SYST BP GE 130 - 139MM HG: CPT | Performed by: NURSE PRACTITIONER

## 2024-12-04 RX ORDER — ATORVASTATIN CALCIUM 10 MG/1
TABLET, FILM COATED ORAL
Qty: 90 TABLET | Refills: 3 | Status: SHIPPED | OUTPATIENT
Start: 2024-12-04

## 2024-12-04 RX ORDER — HYDROCORTISONE 25 MG/G
CREAM TOPICAL
Qty: 28 G | Refills: 2 | Status: SHIPPED | OUTPATIENT
Start: 2024-12-04

## 2024-12-04 RX ORDER — LISINOPRIL 30 MG/1
TABLET ORAL
Qty: 90 TABLET | Refills: 3 | Status: SHIPPED | OUTPATIENT
Start: 2024-12-04

## 2024-12-04 SDOH — ECONOMIC STABILITY: FOOD INSECURITY: WITHIN THE PAST 12 MONTHS, THE FOOD YOU BOUGHT JUST DIDN'T LAST AND YOU DIDN'T HAVE MONEY TO GET MORE.: NEVER TRUE

## 2024-12-04 SDOH — ECONOMIC STABILITY: FOOD INSECURITY: WITHIN THE PAST 12 MONTHS, YOU WORRIED THAT YOUR FOOD WOULD RUN OUT BEFORE YOU GOT MONEY TO BUY MORE.: NEVER TRUE

## 2024-12-04 SDOH — ECONOMIC STABILITY: INCOME INSECURITY: HOW HARD IS IT FOR YOU TO PAY FOR THE VERY BASICS LIKE FOOD, HOUSING, MEDICAL CARE, AND HEATING?: NOT HARD AT ALL

## 2024-12-04 ASSESSMENT — PATIENT HEALTH QUESTIONNAIRE - PHQ9
SUM OF ALL RESPONSES TO PHQ QUESTIONS 1-9: 0
SUM OF ALL RESPONSES TO PHQ9 QUESTIONS 1 & 2: 0
2. FEELING DOWN, DEPRESSED OR HOPELESS: NOT AT ALL
1. LITTLE INTEREST OR PLEASURE IN DOING THINGS: NOT AT ALL
SUM OF ALL RESPONSES TO PHQ QUESTIONS 1-9: 0

## 2024-12-04 NOTE — PROGRESS NOTES
Chief Complaint:   Rick Smith is a 59 y.o. male who presents for complete physical examination    History of Present Illness:    Chief Complaint   Patient presents with    Annual Exam     Health Maintenance   Topic Date Due    Hepatitis B vaccine (1 of 3 - 19+ 3-dose series) Never done    Flu vaccine (1) Never done    COVID-19 Vaccine (1 - 2023-24 season) Never done    Lipids  12/13/2024    Shingles vaccine (2 of 2) 01/14/2025    DTaP/Tdap/Td vaccine (2 - Td or Tdap) 11/19/2025    Depression Screen  12/04/2025    Colorectal Cancer Screen  04/28/2033    Hepatitis C screen  Completed    Hepatitis A vaccine  Aged Out    Hib vaccine  Aged Out    Polio vaccine  Aged Out    Meningococcal (ACWY) vaccine  Aged Out    Pneumococcal 0-64 years Vaccine  Aged Out    HIV screen  Discontinued    Prostate Specific Antigen (PSA) Screening or Monitoring  Discontinued     Patient here for annual exam overall patient is a very healthy and active male he works as a  he does get some knee pains overall he does very well blood pressure has been doing well he is due for his annual blood work      There is no problem list on file for this patient.    Past Medical History:   Diagnosis Date    Hyperlipidemia     Hypertension        No past surgical history on file.    Current Outpatient Medications   Medication Sig Dispense Refill    atorvastatin (LIPITOR) 10 MG tablet TAKE ONE TABLET DAILY, BY MOUTH. 90 tablet 3    lisinopril (PRINIVIL;ZESTRIL) 30 MG tablet TAKE ONE TABLET DAILY, BY MOUTH. 90 tablet 3    hydrocortisone (PROCTO-MED HC) 2.5 % CREA rectal cream APPLY TO AFFECTED AREA, TWO TIMES A DAY, AS DIRECTED. 28 g 2    tadalafil (CIALIS) 5 MG tablet Take 1 tablet by mouth daily 90 tablet 3    Omega-3 Fatty Acids (OMEGA-3 FISH OIL) 500 MG CAPS Take 1 capsule by mouth daily       No current facility-administered medications for this visit.     No Known Allergies    Social History     Socioeconomic History    Marital status:

## 2024-12-06 NOTE — PROGRESS NOTES
6/20/2024    TELEHEALTH EVALUATION -- Audio/Visual    HPI:    Mr. Smith is a 58-year-old male that presents in follow-up for rising PSA, ED, and LUTs.     PSA did rise significantly from 2022 to 2023. However, it is currently stable. Reports that his brother had prostate cancer at a young age.      Also with BPH and ED. Reports little incontinence. Taking Cialis 5 mg daily. Understands     Review of Systems   Genitourinary:  Negative for difficulty urinating, dysuria and hematuria.       Prior to Visit Medications    Medication Sig Taking? Authorizing Provider   tadalafil (CIALIS) 5 MG tablet Take 1 tablet by mouth daily  Maria E Juarez PA-C   atorvastatin (LIPITOR) 10 MG tablet TAKE ONE TABLET DAILY, BY MOUTH.  Shorty Aragon APRN - CNP   hydrocortisone (PROCTO-MED HC) 2.5 % CREA rectal cream APPLY TO AFFECTED AREA, TWO TIMES A DAY, AS DIRECTED.  Shorty Aragon APRN - CNP   lisinopril (PRINIVIL;ZESTRIL) 30 MG tablet TAKE ONE TABLET DAILY, BY MOUTH.  Shorty Aragon APRN - CNP   Saw Dawson 1000 MG CAPS Take by mouth  Suki Porter MD   Omega-3 Fatty Acids (OMEGA-3 FISH OIL) 500 MG CAPS Take 1 capsule by mouth daily  ProviderSuki MD       Social History     Tobacco Use    Smoking status: Never    Smokeless tobacco: Never   Substance Use Topics    Alcohol use: Yes     Alcohol/week: 0.0 standard drinks of alcohol    Drug use: Never            PHYSICAL EXAMINATION:  [ INSTRUCTIONS:  \"[x]\" Indicates a positive item  \"[]\" Indicates a negative item  -- DELETE ALL ITEMS NOT EXAMINED]  Vital Signs: (As obtained by patient/caregiver or practitioner observation)    Blood pressure-  Heart rate-    Respiratory rate-    Temperature-  Pulse oximetry-     Constitutional: [x] Appears well-developed and well-nourished [x] No apparent distress      [] Abnormal-   Mental status  [x] Alert and awake      Eyes:  EOM    [x]  Normal  [] Abnormal-  Sclera  [x]  Normal  [] Abnormal -         Discharge [x]  None  hand grasp, leg strength strong and equal bilaterally

## 2024-12-16 RX ORDER — HYDROCORTISONE 25 MG/G
CREAM TOPICAL
Qty: 28 G | Refills: 2 | Status: SHIPPED | OUTPATIENT
Start: 2024-12-16

## 2025-01-15 ENCOUNTER — LAB (OUTPATIENT)
Dept: LAB | Age: 60
End: 2025-01-15

## 2025-01-15 DIAGNOSIS — Z12.5 SCREENING FOR PROSTATE CANCER: Primary | ICD-10-CM

## 2025-01-15 DIAGNOSIS — I10 ESSENTIAL HYPERTENSION: ICD-10-CM

## 2025-01-15 LAB
ALBUMIN SERPL BCG-MCNC: 4.5 G/DL (ref 3.5–5.1)
ALP SERPL-CCNC: 51 U/L (ref 38–126)
ALT SERPL W/O P-5'-P-CCNC: 20 U/L (ref 11–66)
ANION GAP SERPL CALC-SCNC: 12 MEQ/L (ref 8–16)
AST SERPL-CCNC: 20 U/L (ref 5–40)
BASOPHILS ABSOLUTE: 0 THOU/MM3 (ref 0–0.1)
BASOPHILS NFR BLD AUTO: 0.7 %
BILIRUB SERPL-MCNC: 0.6 MG/DL (ref 0.3–1.2)
BUN SERPL-MCNC: 13 MG/DL (ref 7–22)
CALCIUM SERPL-MCNC: 9.7 MG/DL (ref 8.5–10.5)
CHLORIDE SERPL-SCNC: 100 MEQ/L (ref 98–111)
CHOLEST SERPL-MCNC: 157 MG/DL (ref 100–199)
CO2 SERPL-SCNC: 25 MEQ/L (ref 23–33)
CREAT SERPL-MCNC: 0.8 MG/DL (ref 0.4–1.2)
DEPRECATED RDW RBC AUTO: 43.4 FL (ref 35–45)
EOSINOPHIL NFR BLD AUTO: 1.3 %
EOSINOPHILS ABSOLUTE: 0.1 THOU/MM3 (ref 0–0.4)
ERYTHROCYTE [DISTWIDTH] IN BLOOD BY AUTOMATED COUNT: 13.4 % (ref 11.5–14.5)
GFR SERPL CREATININE-BSD FRML MDRD: > 90 ML/MIN/1.73M2
GLUCOSE SERPL-MCNC: 103 MG/DL (ref 70–108)
HCT VFR BLD AUTO: 45.8 % (ref 42–52)
HDLC SERPL-MCNC: 48 MG/DL
HGB BLD-MCNC: 14.7 GM/DL (ref 14–18)
IMM GRANULOCYTES # BLD AUTO: 0.01 THOU/MM3 (ref 0–0.07)
IMM GRANULOCYTES NFR BLD AUTO: 0.2 %
LDLC SERPL CALC-MCNC: 94 MG/DL
LYMPHOCYTES ABSOLUTE: 2.2 THOU/MM3 (ref 1–4.8)
LYMPHOCYTES NFR BLD AUTO: 37.1 %
MCH RBC QN AUTO: 28.2 PG (ref 26–33)
MCHC RBC AUTO-ENTMCNC: 32.1 GM/DL (ref 32.2–35.5)
MCV RBC AUTO: 87.7 FL (ref 80–94)
MONOCYTES ABSOLUTE: 0.5 THOU/MM3 (ref 0.4–1.3)
MONOCYTES NFR BLD AUTO: 8.7 %
NEUTROPHILS ABSOLUTE: 3.1 THOU/MM3 (ref 1.8–7.7)
NEUTROPHILS NFR BLD AUTO: 52 %
NRBC BLD AUTO-RTO: 0 /100 WBC
PLATELET # BLD AUTO: 276 THOU/MM3 (ref 130–400)
PMV BLD AUTO: 11 FL (ref 9.4–12.4)
POTASSIUM SERPL-SCNC: 4.8 MEQ/L (ref 3.5–5.2)
PROT SERPL-MCNC: 7.3 G/DL (ref 6.1–8)
RBC # BLD AUTO: 5.22 MILL/MM3 (ref 4.7–6.1)
SODIUM SERPL-SCNC: 137 MEQ/L (ref 135–145)
TRIGL SERPL-MCNC: 77 MG/DL (ref 0–199)
WBC # BLD AUTO: 6 THOU/MM3 (ref 4.8–10.8)

## 2025-01-16 LAB — PSA SERPL-MCNC: 1.7 NG/ML (ref 0–1)

## 2025-02-17 ENCOUNTER — TELEPHONE (OUTPATIENT)
Dept: SURGERY | Age: 60
End: 2025-02-17

## 2025-02-17 ENCOUNTER — TELEPHONE (OUTPATIENT)
Dept: ALLERGY | Age: 60
End: 2025-02-17

## 2025-02-17 DIAGNOSIS — H93.90 EAR LESION: Primary | ICD-10-CM

## 2025-02-17 DIAGNOSIS — C44.209 CANCER OF SKIN OF LEFT EAR: ICD-10-CM

## 2025-02-17 NOTE — TELEPHONE ENCOUNTER
----- Message from Dr. Jose Luis Sung MD sent at 2/17/2025 11:27 AM EST -----    No dermatology or ent or plastics    ----- Message -----  From: Chantel Dietrich  Sent: 2/17/2025  11:09 AM EST  To: Jose Luis Sung MD    Patient that needs a biopsy of the ear.  Will you see this patient?

## 2025-02-17 NOTE — TELEPHONE ENCOUNTER
[facility] notified of Dr. Sung's message below.

## 2025-02-17 NOTE — TELEPHONE ENCOUNTER
59-year-old male with a nonhealing skin ulceration area that is fragile and bleeds easily and inside his left ear on the gunnar area. Aproximate 5 mm +/- in diameter with ulceration noted.  Tissue is friable and bleeds easily.  Patient states that sometimes he can just touch the area that bleeds.  Patient has had years of sun exposure without using sunscreen.  No previous history of skin cancers.  I did speak with  on February 16, 2025 who is willing to biopsy the area to determine if this is a squamous of cell cancer.  No black areas noted.  Edges do appeared somewhat rolled.  Questionable extensive involvement that goes down into the lower gunnar area.

## 2025-02-27 ENCOUNTER — TELEPHONE (OUTPATIENT)
Dept: ALLERGY | Age: 60
End: 2025-02-27

## 2025-02-27 DIAGNOSIS — H93.92 LESION OF LEFT EAR: Primary | ICD-10-CM

## 2025-02-27 NOTE — TELEPHONE ENCOUNTER
Patient complains of having a left ear lesion that he has had for approximately 2+ months.  Skin lesion bleeds easily.  Is nonhealing and appears to be an ulcer.  Potential for squamous versus basal carcinoma.  Asking  [facility] to evaluate and please do biopsy.  See scanned images

## 2025-03-06 ENCOUNTER — OFFICE VISIT (OUTPATIENT)
Dept: SURGERY | Age: 60
End: 2025-03-06
Payer: COMMERCIAL

## 2025-03-06 VITALS
WEIGHT: 230 LBS | OXYGEN SATURATION: 99 % | HEART RATE: 80 BPM | RESPIRATION RATE: 18 BRPM | SYSTOLIC BLOOD PRESSURE: 134 MMHG | TEMPERATURE: 97 F | HEIGHT: 70 IN | BODY MASS INDEX: 32.93 KG/M2 | DIASTOLIC BLOOD PRESSURE: 78 MMHG

## 2025-03-06 DIAGNOSIS — L98.9 SKIN LESION: Primary | ICD-10-CM

## 2025-03-06 PROCEDURE — 11104 PUNCH BX SKIN SINGLE LESION: CPT | Performed by: SURGERY

## 2025-03-13 ENCOUNTER — TELEPHONE (OUTPATIENT)
Dept: SURGERY | Age: 60
End: 2025-03-13

## 2025-03-13 DIAGNOSIS — C44.219 BASAL CELL CARCINOMA (BCC) OF CONCHAL BOWL OF LEFT EAR: Primary | ICD-10-CM

## 2025-03-13 NOTE — TELEPHONE ENCOUNTER
Patient was advised of his pathology results from his ear biopsy.  Dr. Corbin recommended [facility] but patient would like to stay in [place].  Referral made to Dr. Ovalles per patient's choice.

## 2025-03-13 NOTE — PROGRESS NOTES
Treva Corbin MD  [facility]    Pt Name: Rick Smith  MRN: [id number]  YOB: 1965  Date of evaluation: 03/06/2025    Primary Care Physician: Shorty Aragon APRN - CNP  Patient evaluated at the request of  Jayda Tillman   Reason for evaluation: ear leison  ASSESSMENT and PLAN:     Assessment & Plan  1. Lesion on the ear.  The patient has noticed the lesion for the last couple of months, and it has not healed. Given the patient's family history of melanoma and skin cancer, a biopsy is warranted to determine the nature of the lesion. A punch biopsy will be performed. If suturing is not possible, the base of the lesion will be cauterized. Further treatment will be determined based on the biopsy results.    There is no problem list on file for this patient.    Visit Diagnoses         Codes      Skin lesion    -  Primary L98.9           SUBJECTIVE:   CC:left ear lesion    History of Chief Complaint:    History of Present Illness  The patient presents for evaluation of a lesion on her ear.    She was referred to our facility by Dr. Jayda Tillman for further evaluation of the lesion. She has been observing this lesion for the past few months, which has shown no signs of healing. She spends a significant amount of time outdoors. She has no personal history of skin cancer but reports that 2 of her sisters have had melanoma and her brother had a lesion on his ear that required extensive excision.    FAMILY HISTORY  Two sisters have had melanoma skin cancer. Brother has had a lesion around his ear that required extensive excision.       Past Medical History  Past Medical History:   Diagnosis Date    Hyperlipidemia     Hypertension        Past Surgical History  Past Surgical History:   Procedure Laterality Date    COLONOSCOPY  04/28/2023    Dr. Turner       Medications  Current Outpatient Medications on File Prior to Visit   Medication Sig Dispense Refill    hydrocortisone (PROCTO-MED HC) 2.5 %

## 2025-03-13 NOTE — TELEPHONE ENCOUNTER
Left a message for patient to return my call.  Patient needs referral to plastic surgeon of choice.  [facility] does not take patient's insurance.

## 2025-04-22 ENCOUNTER — OFFICE VISIT (OUTPATIENT)
Dept: FAMILY MEDICINE CLINIC | Age: 60
End: 2025-04-22

## 2025-04-22 VITALS
WEIGHT: 227.6 LBS | HEART RATE: 92 BPM | BODY MASS INDEX: 32.58 KG/M2 | HEIGHT: 70 IN | OXYGEN SATURATION: 96 % | DIASTOLIC BLOOD PRESSURE: 76 MMHG | RESPIRATION RATE: 16 BRPM | TEMPERATURE: 97.7 F | SYSTOLIC BLOOD PRESSURE: 132 MMHG

## 2025-04-22 DIAGNOSIS — J20.9 ACUTE BRONCHITIS, UNSPECIFIED ORGANISM: Primary | ICD-10-CM

## 2025-04-22 RX ORDER — AZITHROMYCIN 250 MG/1
TABLET, FILM COATED ORAL
Qty: 1 PACKET | Refills: 0 | Status: SHIPPED | OUTPATIENT
Start: 2025-04-22

## 2025-04-22 RX ORDER — ALBUTEROL SULFATE 90 UG/1
2 INHALANT RESPIRATORY (INHALATION) EVERY 6 HOURS PRN
Qty: 18 G | Refills: 0 | Status: SHIPPED | OUTPATIENT
Start: 2025-04-22 | End: 2026-04-22

## 2025-04-22 RX ORDER — METHYLPREDNISOLONE 4 MG/1
TABLET ORAL
Qty: 1 KIT | Refills: 0 | Status: SHIPPED | OUTPATIENT
Start: 2025-04-22 | End: 2025-04-28

## 2025-04-22 SDOH — ECONOMIC STABILITY: FOOD INSECURITY: WITHIN THE PAST 12 MONTHS, YOU WORRIED THAT YOUR FOOD WOULD RUN OUT BEFORE YOU GOT MONEY TO BUY MORE.: NEVER TRUE

## 2025-04-22 SDOH — ECONOMIC STABILITY: FOOD INSECURITY: WITHIN THE PAST 12 MONTHS, THE FOOD YOU BOUGHT JUST DIDN'T LAST AND YOU DIDN'T HAVE MONEY TO GET MORE.: NEVER TRUE

## 2025-04-22 ASSESSMENT — PATIENT HEALTH QUESTIONNAIRE - PHQ9
SUM OF ALL RESPONSES TO PHQ QUESTIONS 1-9: 0
1. LITTLE INTEREST OR PLEASURE IN DOING THINGS: NOT AT ALL
SUM OF ALL RESPONSES TO PHQ QUESTIONS 1-9: 0
2. FEELING DOWN, DEPRESSED OR HOPELESS: NOT AT ALL

## 2025-04-22 ASSESSMENT — ENCOUNTER SYMPTOMS
EYES NEGATIVE: 1
GASTROINTESTINAL NEGATIVE: 1
WHEEZING: 1
COUGH: 1

## 2025-04-22 NOTE — PROGRESS NOTES
Rick Smith is a 59 y.o. male who presents today for :  Chief Complaint   Patient presents with    Cough     Ongoing for a week   Productive   Slowly getting better     Vitals:    04/22/25 1039   BP: 132/76   Pulse: 92   Resp: 16   Temp: 97.7 °F (36.5 °C)   SpO2: 96%       HPI:     History of Present Illness  The patient presents for evaluation of a persistent cough.    He reports that the cough began on Wednesday, 04/16/2025, following the onset of a sore throat on Sunday, 04/13/2025. Accompanying symptoms include rhinorrhea and expectoration of white, creamy sputum. Intermittent coughing fits with a sensation of chest rattling are described. Despite these symptoms, no fever or chills have been experienced, and energy levels have been maintained, allowing continued work. Exposure to sick friends approximately 2 weeks ago is recalled, but no known exposure to moldy environments is reported. A history of bronchitis during grade school years is noted, with wheezing occurring only during illness and not during physical exertion. Self-medication with Mucinex DM has provided some relief.    A basal cell carcinoma on the ear is also reported. A consultation and probable surgery are planned. The patient's daughter,, noticed a scab on the ear and suspected basal cell carcinoma. A biopsy was set up immediately, confirming the diagnosis. The lesion has been present for about 1 to 2 months, almost healing but then recurring. Occasional bleeding is noted. An appointment with Dr. Ovalles in [place] for further evaluation and treatment is scheduled.    Hearing aids are needed but will be addressed after the ear condition is managed.         There is no problem list on file for this patient.    Past Medical History:   Diagnosis Date    Hyperlipidemia     Hypertension       Past Surgical History:   Procedure Laterality Date    COLONOSCOPY  04/28/2023    Dr. Turner     Family History   Problem Relation Age of Onset    Cancer Father

## 2025-05-08 ENCOUNTER — HOSPITAL ENCOUNTER (OUTPATIENT)
Age: 60
Discharge: HOME | End: 2025-05-08
Payer: COMMERCIAL

## 2025-05-08 LAB
ANION GAP SERPL CALC-SCNC: 12 MEQ/L (ref 8–16)
BUN SERPL-MCNC: 15 MG/DL (ref 8–23)
CALCIUM SERPL-MCNC: 9.4 MG/DL (ref 8.6–10)
CHLORIDE SERPL-SCNC: 101 MEQ/L (ref 98–111)
CO2 SERPL-SCNC: 26 MEQ/L (ref 22–29)
CREAT SERPL-MCNC: 0.8 MG/DL (ref 0.7–1.2)
DEPRECATED RDW RBC AUTO: 43.8 FL (ref 35–45)
EKG ATRIAL RATE: 74 BPM
EKG P AXIS: 4 DEGREES
EKG P-R INTERVAL: 142 MS
EKG Q-T INTERVAL: 388 MS
EKG QRS DURATION: 94 MS
EKG QTC CALCULATION (BAZETT): 430 MS
EKG R AXIS: -37 DEGREES
EKG T AXIS: 25 DEGREES
EKG VENTRICULAR RATE: 74 BPM
ERYTHROCYTE [DISTWIDTH] IN BLOOD BY AUTOMATED COUNT: 13.8 % (ref 11.5–14.5)
GFR SERPL CREATININE-BSD FRML MDRD: > 90 ML/MIN/1.73M2
GLUCOSE SERPL-MCNC: 121 MG/DL (ref 74–109)
HCT VFR BLD AUTO: 46.6 % (ref 42–52)
HGB BLD-MCNC: 15 GM/DL (ref 14–18)
MCH RBC QN AUTO: 28.2 PG (ref 26–33)
MCHC RBC AUTO-ENTMCNC: 32.2 GM/DL (ref 32.2–35.5)
MCV RBC AUTO: 87.6 FL (ref 80–94)
PLATELET # BLD AUTO: 311 THOU/MM3 (ref 130–400)
PMV BLD AUTO: 11 FL (ref 9.4–12.4)
POTASSIUM SERPL-SCNC: 4 MEQ/L (ref 3.5–5.2)
RBC # BLD AUTO: 5.32 MILL/MM3 (ref 4.7–6.1)
SODIUM SERPL-SCNC: 139 MEQ/L (ref 135–145)
WBC # BLD AUTO: 7.8 THOU/MM3 (ref 4.8–10.8)

## 2025-05-08 PROCEDURE — 36415 COLL VENOUS BLD VENIPUNCTURE: CPT

## 2025-05-08 PROCEDURE — 93010 ELECTROCARDIOGRAM REPORT: CPT | Performed by: INTERNAL MEDICINE

## 2025-05-08 PROCEDURE — 80048 BASIC METABOLIC PNL TOTAL CA: CPT

## 2025-05-08 PROCEDURE — 85027 COMPLETE CBC AUTOMATED: CPT

## 2025-05-08 PROCEDURE — 93005 ELECTROCARDIOGRAM TRACING: CPT | Performed by: SPECIALIST

## 2025-06-03 NOTE — PROGRESS NOTES
PAT call attempted, patient unavailable, left message to please call us back at your earliest convenience; [phone]

## 2025-06-04 NOTE — PROGRESS NOTES
NPO after midnight (may have 8 oz of water up to 2 hours before surgery)  Bring insurance info and drivers license  Wear comfortable clean clothing  Do not bring jewelry  Shower night before and morning of surgery with a liquid antibacterial soap  Bring list of medications with dosage and how often taken  Follow all instructions given by your physician   needed at discharge  You must have a responsible adult with you day of surgery and for 24 hours after surgery  Call [phone] for any questions

## 2025-06-04 NOTE — PROGRESS NOTES
In preparation for their surgical procedure above patient was screened for Obstructive Sleep Apnea (ERICKA) using the STOP-Bang Questionnaire by the Pre-Admission Testing department.  This is a pre-surgical screening tool for patient safety and serves as a recommendation, this WILL NOT cause cancellation of surgery.    STOP-Bang Questionnaire  * Do you currently see a pulmonologist?  No     If yes STOP, do not complete.  Patient follows with Dr.     1.  Do you snore loudly (able to be heard in the next room)?      No    2.  Do you often feel tired or sleepy during the daytime?          No       3.  Has anyone ever told you that you stop breathing during your sleep?       No    4.  Do you have or are you being treated for high blood pressure?          Yes      5.  BMI more than 35?  BMI (Calculated): 33.1        No    6.  Age over 50 years? 59 y.o.      Yes    7.  Neck Circumference greater than 17 inches for male or 16 inches for female?  Measured           (visits only)            Not Applicable    8.  Gender Male?                 Yes      TOTAL SCORE: 3    ERICKA - Low Risk : Yes to 0 - 2 questions  ERICKA - Intermediate Risk : Yes to 3 - 4 questions  ERICKA - High Risk : Yes to 5 - 8 questions    Adapted from:   STOP Questionnaire: A Tool to Screen Patients for Obstructive Sleep Apnea   MISAEL Anton.R.C.P.C., Christopher Jane M.B.B.S., Willian Rousseau M.D., Belem Plummer, Ph.D., MATTY Estrada.B.B.S., MATTY Barrera.Sc., Susy Holland M.D., Danie Stewart F.R.C.P.C.   Anesthesiology 2008; 108:812-21 Copyright 2008, the American Society of Anesthesiologists, Inc. [employer]   ----------------------------------------------------------------------------------------------------------------

## 2025-06-11 ENCOUNTER — ANESTHESIA EVENT (OUTPATIENT)
Dept: OPERATING ROOM | Age: 60
End: 2025-06-11
Payer: COMMERCIAL

## 2025-06-11 ENCOUNTER — HOSPITAL ENCOUNTER (OUTPATIENT)
Age: 60
Setting detail: OUTPATIENT SURGERY
Discharge: HOME | End: 2025-06-11
Attending: SPECIALIST | Admitting: SPECIALIST
Payer: COMMERCIAL

## 2025-06-11 ENCOUNTER — ANESTHESIA (OUTPATIENT)
Dept: OPERATING ROOM | Age: 60
End: 2025-06-11
Payer: COMMERCIAL

## 2025-06-11 VITALS
TEMPERATURE: 98.6 F | RESPIRATION RATE: 16 BRPM | BODY MASS INDEX: 33.07 KG/M2 | OXYGEN SATURATION: 99 % | HEART RATE: 60 BPM | SYSTOLIC BLOOD PRESSURE: 131 MMHG | DIASTOLIC BLOOD PRESSURE: 78 MMHG | WEIGHT: 231 LBS | HEIGHT: 70 IN

## 2025-06-11 PROCEDURE — 3700000001 HC ADD 15 MINUTES (ANESTHESIA): Performed by: SPECIALIST

## 2025-06-11 PROCEDURE — 3600000002 HC SURGERY LEVEL 2 BASE: Performed by: SPECIALIST

## 2025-06-11 PROCEDURE — 2500000003 HC RX 250 WO HCPCS: Performed by: SPECIALIST

## 2025-06-11 PROCEDURE — 6360000002 HC RX W HCPCS: Performed by: SPECIALIST

## 2025-06-11 PROCEDURE — 2709999900 HC NON-CHARGEABLE SUPPLY: Performed by: SPECIALIST

## 2025-06-11 PROCEDURE — 3600000012 HC SURGERY LEVEL 2 ADDTL 15MIN: Performed by: SPECIALIST

## 2025-06-11 PROCEDURE — 7100000011 HC PHASE II RECOVERY - ADDTL 15 MIN: Performed by: SPECIALIST

## 2025-06-11 PROCEDURE — 6370000000 HC RX 637 (ALT 250 FOR IP)

## 2025-06-11 PROCEDURE — 2580000003 HC RX 258: Performed by: NURSE ANESTHETIST, CERTIFIED REGISTERED

## 2025-06-11 PROCEDURE — 6370000000 HC RX 637 (ALT 250 FOR IP): Performed by: SPECIALIST

## 2025-06-11 PROCEDURE — 7100000010 HC PHASE II RECOVERY - FIRST 15 MIN: Performed by: SPECIALIST

## 2025-06-11 PROCEDURE — 3700000000 HC ANESTHESIA ATTENDED CARE: Performed by: SPECIALIST

## 2025-06-11 PROCEDURE — 6360000002 HC RX W HCPCS: Performed by: NURSE ANESTHETIST, CERTIFIED REGISTERED

## 2025-06-11 RX ORDER — SODIUM CHLORIDE 9 MG/ML
INJECTION, SOLUTION INTRAVENOUS
Status: DISCONTINUED | OUTPATIENT
Start: 2025-06-11 | End: 2025-06-11 | Stop reason: SDUPTHER

## 2025-06-11 RX ORDER — FENTANYL CITRATE 50 UG/ML
INJECTION, SOLUTION INTRAMUSCULAR; INTRAVENOUS
Status: DISCONTINUED | OUTPATIENT
Start: 2025-06-11 | End: 2025-06-11 | Stop reason: SDUPTHER

## 2025-06-11 RX ORDER — LIDOCAINE HYDROCHLORIDE 20 MG/ML
INJECTION, SOLUTION INTRAVENOUS
Status: DISCONTINUED | OUTPATIENT
Start: 2025-06-11 | End: 2025-06-11 | Stop reason: SDUPTHER

## 2025-06-11 RX ORDER — PROPOFOL 10 MG/ML
INJECTION, EMULSION INTRAVENOUS
Status: DISCONTINUED | OUTPATIENT
Start: 2025-06-11 | End: 2025-06-11 | Stop reason: SDUPTHER

## 2025-06-11 RX ORDER — ACETAMINOPHEN 500 MG
1000 TABLET ORAL ONCE
Status: COMPLETED | OUTPATIENT
Start: 2025-06-11 | End: 2025-06-11

## 2025-06-11 RX ORDER — GINSENG 100 MG
CAPSULE ORAL PRN
Status: DISCONTINUED | OUTPATIENT
Start: 2025-06-11 | End: 2025-06-11 | Stop reason: ALTCHOICE

## 2025-06-11 RX ORDER — LIDOCAINE HYDROCHLORIDE AND EPINEPHRINE 10; 10 MG/ML; UG/ML
INJECTION, SOLUTION INFILTRATION; PERINEURAL PRN
Status: DISCONTINUED | OUTPATIENT
Start: 2025-06-11 | End: 2025-06-11 | Stop reason: ALTCHOICE

## 2025-06-11 RX ORDER — ACETAMINOPHEN 500 MG
TABLET ORAL
Status: COMPLETED
Start: 2025-06-11 | End: 2025-06-11

## 2025-06-11 RX ADMIN — PROPOFOL 50 MCG/KG/MIN: 10 INJECTION, EMULSION INTRAVENOUS at 09:49

## 2025-06-11 RX ADMIN — FENTANYL CITRATE 50 MCG: 50 INJECTION, SOLUTION INTRAMUSCULAR; INTRAVENOUS at 09:48

## 2025-06-11 RX ADMIN — SODIUM CHLORIDE: 9 INJECTION, SOLUTION INTRAVENOUS at 09:45

## 2025-06-11 RX ADMIN — PROPOFOL 50 MG: 10 INJECTION, EMULSION INTRAVENOUS at 09:47

## 2025-06-11 RX ADMIN — FENTANYL CITRATE 25 MCG: 50 INJECTION, SOLUTION INTRAMUSCULAR; INTRAVENOUS at 09:57

## 2025-06-11 RX ADMIN — FENTANYL CITRATE 25 MCG: 50 INJECTION, SOLUTION INTRAMUSCULAR; INTRAVENOUS at 09:55

## 2025-06-11 RX ADMIN — LIDOCAINE HYDROCHLORIDE 50 MG: 20 INJECTION, SOLUTION INTRAVENOUS at 09:47

## 2025-06-11 RX ADMIN — Medication 1000 MG: at 10:59

## 2025-06-11 RX ADMIN — ACETAMINOPHEN 1000 MG: 500 TABLET ORAL at 10:59

## 2025-06-11 RX ADMIN — WATER 2000 MG: 1 INJECTION INTRAMUSCULAR; INTRAVENOUS; SUBCUTANEOUS at 09:50

## 2025-06-11 ASSESSMENT — PAIN - FUNCTIONAL ASSESSMENT: PAIN_FUNCTIONAL_ASSESSMENT: 0-10

## 2025-06-11 ASSESSMENT — PAIN SCALES - GENERAL: PAINLEVEL_OUTOF10: 4

## 2025-06-11 NOTE — PROGRESS NOTES
1044: Pt arrived to Phase II recovery via chair. Awake and alert. Report received from BRITTNEY Torres.  1045: VSS. Pt states pain to ear. Snack and drink provided. Call light in reach. Wife in room.  1100: Tylenol given per order-see MAR. IV removed. Pt dressed.  1115: Discharge instructions reviewed with patient and patient's wife. AVS provided for discharge.   1120: Pt escorted to vehicle and discharged home in stable condition with wife.

## 2025-06-11 NOTE — H&P
[facility]  History and Physical Update    Pt Name: Rick Smith  MRN: [id number]  YOB: 1965  Date of evaluation: 6/11/2025    I have examined the patient and reviewed the H&P/Consult and there are no changes to the patient or plans.      Kar Ovalles MD  Electronically signed 6/11/2025 at 9:35 AM

## 2025-06-11 NOTE — DISCHARGE INSTRUCTIONS
POST OPERATIVE INSTRUCTION SHEET  SKIN TUMOR/LESION REMOVAL          Activity:    No strenuous activity for 48 hours  No activity that stresses the suture closure/incision  Regular diet  ABSOLUTELY NO NICOTINE OF ANY TYPE    Wound Care:  Head wrap dressing to remain intact for 48 hours. After 48 hours, you may remove the ace wrap and white gauze. Cotton ball dressing to ear to remain clean, dry and intact until seen in office. Do not remove and do not get wet.  Dermabond was used to left chest-do not scrub, rub or pick at adhesive glue  Keep all incisions clean    Limitations:  No swimming, hot tub, sauna or soaking in a bathtub    Prescriptions:  Take exactly as prescribed  Tylenol for pain as needed, next dose may be taken tonight at 5pm  Complete antibiotic as prescribed, next dose tonight with food    Follow-Up:  June 19, 2025 @ 8:30 am      Notify our office if you experience any of the following:   Develop a fever (temperature is greater than 100.5F)   Develop redness greater than 1 cm around incision or red streaks up extremity   Have any excess bleeding/ increased drainage or swelling at the incision site    *Note:  Your pathology results will be reviewed with you at your scheduled follow-up appointment.

## 2025-06-11 NOTE — ANESTHESIA PRE PROCEDURE
Department of Anesthesiology  Preprocedure Note       Name:  Rick Smith   Age:  59 y.o.  :  1965                                          MRN:  [id number]         Date:  2025      Surgeon: Surgeon(s):  Kar Ovalles MD    Procedure: Procedure(s):  REPAIR OF LEFT EAR defect status post MOHS for basal cell carcinoma with full thickness skin graft from left upper chest    Medications prior to admission:   Prior to Admission medications    Medication Sig Start Date End Date Taking? Authorizing Provider   atorvastatin (LIPITOR) 10 MG tablet TAKE ONE TABLET DAILY, BY MOUTH. 24  Yes Shorty Aragon APRN - CNP   lisinopril (PRINIVIL;ZESTRIL) 30 MG tablet TAKE ONE TABLET DAILY, BY MOUTH. 24  Yes Shorty Aragon APRN - CNP   hydrocortisone (PROCTO-MED HC) 2.5 % CREA rectal cream APPLY TO AFFECTED AREA, TWO TIMES A DAY, AS DIRECTED. 24   Shorty Aragon APRN - CNP   tadalafil (CIALIS) 5 MG tablet Take 1 tablet by mouth daily 6/20/24 6/15/25  Maria E Juarez PA-C       Current medications:    Current Facility-Administered Medications   Medication Dose Route Frequency Provider Last Rate Last Admin    lidocaine-EPINEPHrine 1 %-1:[id number] injection    PRN Kar Ovalles MD   17 mL at 25 0948    bacitracin ointment    PRN Kar Ovalles MD   1 Application at 25 1010     Facility-Administered Medications Ordered in Other Encounters   Medication Dose Route Frequency Provider Last Rate Last Admin    0.9 % sodium chloride infusion   IntraVENous Continuous PRN Georgette Brooks APRN - CRNA   New Bag at 25 0945    lidocaine (cardiac) (XYLOCAINE) injection   IntraVENous Once PRN Georgette Brooks APRN - CRNA   50 mg at 25 0947    propofol infusion   IntraVENous Once PRN Georgette Brooks APRN - CRNA 50.304 mL/hr at 25 1030 80 mcg/kg/min at 25 1030    fentaNYL (SUBLIMAZE) injection   IntraVENous Once PRN Georgette Brooks APRN - CRNA   25 mcg at 25 0957

## 2025-06-11 NOTE — OP NOTE
Operative Note    Patient name: Rick Smith             Medical Record Number: [id number]    Primary Care Physician: Shorty Aragon, DAMIEN - CNP     1965    Date of Procedure: 2025    Pre-operative Diagnosis: 6cm2 defect of left ear (conchal bowl) s/p MOHS for basal cell carcinoma    Post-operative Diagnosis: Same    Procedure Performed: Full thickness skin graft (6 cm2) repair of left ear (conchal bowl) defect    Surgeons/Assistants: Dr. Kar Ovalles MD     Estimated Blood Loss: 5ml     Complications: none immediately appreciated    Procedure:    With the patient lying in the supine position and under adequate anesthesia per the anesthesia team.   Local anesthesia consisting of 17 ml of 1% Lidocaine 1:100,000 with epinephrine solution of the donor site of the left infraclavicular area as well as the left ear.  The area was prepped and draped in the standard surgical fashion.  The patient has very thin skin and a large (6cm2) defect which could not be closed primarily, therefore a full thickness skin graft was taken.  It was defatted and secured in position with a 3-0 silk suture tie and then a 5-0 fast absorbable suture was placed in a simple running fashion.  A Bacitracin Xeroform and moist cotton ball tie over bolster was secured using the tails of the silk sutures.  The donor site was closed with a 3-0 Monocryl suture placed in interrupted buried fashion and then Histoacryl respectively.  The patient tolerated the procedure well and remained hemodynamically stable throughout the procedure and was quite comfortable throughout the operative course.    Clinical staging for cancer cases:  Ct  Cn  Cm    Kar Ovalles MD  Electronically signed by me on 2025 at 10:42 AMOperative Note      Patient: Rick Smith  YOB: 1965  MRN: [id number]    Date of Procedure: 2025    Pre-Op Diagnosis Codes:      * Basal cell carcinoma (BCC) of left ear [C44.219]    Post-Op Diagnosis:

## 2025-06-11 NOTE — ANESTHESIA POSTPROCEDURE EVALUATION
Department of Anesthesiology  Postprocedure Note    Patient: Rick Smith  MRN: [id number]  YOB: 1965  Date of evaluation: 6/11/2025    Procedure Summary       Date: 06/11/25 Room / Location: [address]    Anesthesia Start: 0944 Anesthesia Stop: 1044    Procedure: REPAIR OF LEFT EAR defect status post MOHS for basal cell carcinoma with full thickness skin graft from left upper chest (Left: Ear) Diagnosis:       Basal cell carcinoma (BCC) of left ear      (Basal cell carcinoma (BCC) of left ear [C44.219])    Surgeons: Kar Ovalles MD Responsible Provider: Kalpesh Barlow DO    Anesthesia Type: general ASA Status: 2            Anesthesia Type: No value filed.    Hugh Phase I: Hugh Score: 10    Hugh Phase II:      Anesthesia Post Evaluation    Patient location during evaluation: bedside  Patient participation: complete - patient participated  Level of consciousness: awake  Pain score: 2  Airway patency: patent  Nausea & Vomiting: no nausea and no vomiting  Cardiovascular status: blood pressure returned to baseline  Respiratory status: acceptable  Hydration status: stable  Comments: EAR STINGS  Pain management: satisfactory to patient        No notable events documented.

## 2025-06-26 ENCOUNTER — RESULTS FOLLOW-UP (OUTPATIENT)
Dept: UROLOGY | Age: 60
End: 2025-06-26

## 2025-06-26 ENCOUNTER — LAB (OUTPATIENT)
Dept: LAB | Age: 60
End: 2025-06-26

## 2025-06-26 DIAGNOSIS — R97.20 RISING PSA LEVEL: ICD-10-CM

## 2025-06-26 LAB — PSA SERPL-MCNC: 1.09 NG/ML (ref 0–1)

## 2025-07-01 RX ORDER — TADALAFIL 5 MG/1
5 TABLET ORAL DAILY
Qty: 90 TABLET | Refills: 0 | Status: SHIPPED | OUTPATIENT
Start: 2025-07-01 | End: 2025-07-21 | Stop reason: SDUPTHER

## 2025-07-21 ENCOUNTER — OFFICE VISIT (OUTPATIENT)
Dept: UROLOGY | Age: 60
End: 2025-07-21
Payer: COMMERCIAL

## 2025-07-21 VITALS — WEIGHT: 231 LBS | RESPIRATION RATE: 18 BRPM | BODY MASS INDEX: 33.07 KG/M2 | HEIGHT: 70 IN

## 2025-07-21 DIAGNOSIS — Z12.5 SCREENING PSA (PROSTATE SPECIFIC ANTIGEN): Primary | ICD-10-CM

## 2025-07-21 DIAGNOSIS — N40.0 BENIGN PROSTATIC HYPERPLASIA, UNSPECIFIED WHETHER LOWER URINARY TRACT SYMPTOMS PRESENT: ICD-10-CM

## 2025-07-21 PROCEDURE — 99214 OFFICE O/P EST MOD 30 MIN: CPT

## 2025-07-21 RX ORDER — TADALAFIL 5 MG/1
5 TABLET ORAL DAILY
Qty: 90 TABLET | Refills: 3 | Status: SHIPPED | OUTPATIENT
Start: 2025-07-21 | End: 2026-07-16

## 2025-07-21 NOTE — PROGRESS NOTES
[facility] [place] [facility]  [facility]  [address]  [address]  [facility] [place] [address]  Dept: [phone]  Loc: [phone]    Visit Date: 7/21/2025        HPI:     HPI  Mr. Smith is a 59-year-old male that presents in follow-up for rising PSA, ED, and LUTs.     PSA did rise significantly from 2022 to 2023. However, it is currently stable. Reports that his brother had prostate cancer at a young age.      Also with BPH and ED. Reports little incontinence. Taking Cialis 5 mg daily.     Current Outpatient Medications   Medication Sig Dispense Refill    tadalafil (CIALIS) 5 MG tablet Take 1 tablet by mouth daily 90 tablet 0    hydrocortisone (PROCTO-MED HC) 2.5 % CREA rectal cream APPLY TO AFFECTED AREA, TWO TIMES A DAY, AS DIRECTED. 28 g 2    atorvastatin (LIPITOR) 10 MG tablet TAKE ONE TABLET DAILY, BY MOUTH. 90 tablet 3    lisinopril (PRINIVIL;ZESTRIL) 30 MG tablet TAKE ONE TABLET DAILY, BY MOUTH. 90 tablet 3     No current facility-administered medications for this visit.       Past Medical History  Rick  has a past medical history of Hyperlipidemia and Hypertension.    Past Surgical History  The patient  has a past surgical history that includes Colonoscopy (04/28/2023) and Condyloma Excision (Left, 6/11/2025).    Family History  This patient's family history includes Cancer in his father and paternal grandmother; Colon Cancer in his paternal grandmother; High Blood Pressure in his mother; Thyroid Disease in his brother and sister.    Social History  Rick  reports that he has never smoked. He has never used smokeless tobacco. He reports current alcohol use. He reports that he does not use drugs.      Subjective:      Review of Systems   Genitourinary:  Negative for hematuria.       Objective:   Resp 18   Ht 1.778 m (5' 10\")   Wt 104.8 kg (231 lb)   BMI 33.15 kg/m²     Physical Exam  Constitutional:       General: He is not in acute distress.     Appearance: Normal

## 2025-07-21 NOTE — PATIENT INSTRUCTIONS
Continue Cialis.   PSA in 1 year.  Call with questions, comments, or concerns. I recommend going to the ED for further evaluation if you develop fever, chills, nausea, vomiting, chest pain, SOB, or calf pain.    The medication list included in this document is our record of what you are currently taking, including any changes that were made at today's visit.  If you find any differences when compared to your medications at home, or have any questions that were not answered at your visit, please contact the office.

## (undated) DEVICE — GLOVE ORANGE PI 7   MSG9070

## (undated) DEVICE — BANDAGE,GAUZE,4.5"X4.1YD,STERILE,LF: Brand: MEDLINE

## (undated) DEVICE — SUTURE MONOCRYL SZ 4-0 L18IN ABSRB UD P-3 L13MM 3/8 CIR PRIM Y494G

## (undated) DEVICE — GOWN,SIRUS,NON REINFRCD,LARGE,SET IN SL: Brand: MEDLINE

## (undated) DEVICE — GLOVE SURG SZ 8 L11.77IN FNGR THK9.8MIL STRW LTX POLYMER

## (undated) DEVICE — COTTON BALL ST

## (undated) DEVICE — Device

## (undated) DEVICE — SUTURE NONABSORBABLE BRAIDED 4-0 SH 30 IN BLK PERMA HND K831H